# Patient Record
Sex: FEMALE | Race: WHITE | Employment: UNEMPLOYED | URBAN - METROPOLITAN AREA
[De-identification: names, ages, dates, MRNs, and addresses within clinical notes are randomized per-mention and may not be internally consistent; named-entity substitution may affect disease eponyms.]

---

## 2020-06-17 ENCOUNTER — HOSPITAL ENCOUNTER (OUTPATIENT)
Dept: PREADMISSION TESTING | Age: 50
Discharge: HOME | End: 2020-06-17
Payer: MEDICARE

## 2020-06-17 VITALS
SYSTOLIC BLOOD PRESSURE: 113 MMHG | OXYGEN SATURATION: 98 % | HEART RATE: 99 BPM | RESPIRATION RATE: 18 BRPM | HEIGHT: 62 IN | TEMPERATURE: 98.4 F | WEIGHT: 212 LBS | BODY MASS INDEX: 39.01 KG/M2 | DIASTOLIC BLOOD PRESSURE: 73 MMHG

## 2020-06-17 LAB
ABO + RH BLD: NORMAL
ATRIAL RATE: 97 BPM
BASOPHILS # BLD: 0.1 K/UL (ref 0–0.1)
BASOPHILS NFR BLD: 1 % (ref 0–1)
BLOOD GROUP ANTIBODIES SERPL: NORMAL
CALCULATED P AXIS, ECG09: 73 DEGREES
CALCULATED R AXIS, ECG10: 49 DEGREES
CALCULATED T AXIS, ECG11: 43 DEGREES
DIAGNOSIS, 93000: NORMAL
DIFFERENTIAL METHOD BLD: ABNORMAL
EOSINOPHIL # BLD: 0.2 K/UL (ref 0–0.4)
EOSINOPHIL NFR BLD: 1 % (ref 0–7)
ERYTHROCYTE [DISTWIDTH] IN BLOOD BY AUTOMATED COUNT: 13 % (ref 11.5–14.5)
HCT VFR BLD AUTO: 41 % (ref 35–47)
HGB BLD-MCNC: 13.3 G/DL (ref 11.5–16)
IMM GRANULOCYTES # BLD AUTO: 0.1 K/UL (ref 0–0.04)
IMM GRANULOCYTES NFR BLD AUTO: 1 % (ref 0–0.5)
LYMPHOCYTES # BLD: 1.7 K/UL (ref 0.8–3.5)
LYMPHOCYTES NFR BLD: 14 % (ref 12–49)
MCH RBC QN AUTO: 29.8 PG (ref 26–34)
MCHC RBC AUTO-ENTMCNC: 32.4 G/DL (ref 30–36.5)
MCV RBC AUTO: 91.7 FL (ref 80–99)
MONOCYTES # BLD: 0.6 K/UL (ref 0–1)
MONOCYTES NFR BLD: 5 % (ref 5–13)
NEUTS SEG # BLD: 9.2 K/UL (ref 1.8–8)
NEUTS SEG NFR BLD: 78 % (ref 32–75)
NRBC # BLD: 0 K/UL (ref 0–0.01)
NRBC BLD-RTO: 0 PER 100 WBC
P-R INTERVAL, ECG05: 114 MS
PLATELET # BLD AUTO: 305 K/UL (ref 150–400)
PMV BLD AUTO: 10 FL (ref 8.9–12.9)
Q-T INTERVAL, ECG07: 330 MS
QRS DURATION, ECG06: 70 MS
QTC CALCULATION (BEZET), ECG08: 419 MS
RBC # BLD AUTO: 4.47 M/UL (ref 3.8–5.2)
SPECIMEN EXP DATE BLD: NORMAL
VENTRICULAR RATE, ECG03: 97 BPM
WBC # BLD AUTO: 11.7 K/UL (ref 3.6–11)

## 2020-06-17 PROCEDURE — 85025 COMPLETE CBC W/AUTO DIFF WBC: CPT

## 2020-06-17 PROCEDURE — 93005 ELECTROCARDIOGRAM TRACING: CPT

## 2020-06-17 PROCEDURE — 86900 BLOOD TYPING SEROLOGIC ABO: CPT

## 2020-06-17 PROCEDURE — 36415 COLL VENOUS BLD VENIPUNCTURE: CPT

## 2020-06-17 RX ORDER — BUPROPION HYDROCHLORIDE 200 MG/1
200 TABLET, EXTENDED RELEASE ORAL DAILY
COMMUNITY

## 2020-06-17 RX ORDER — FLUTICASONE FUROATE AND VILANTEROL 200; 25 UG/1; UG/1
1 POWDER RESPIRATORY (INHALATION) 2 TIMES DAILY
COMMUNITY

## 2020-06-17 RX ORDER — MINERAL OIL
180 ENEMA (ML) RECTAL DAILY
COMMUNITY

## 2020-06-17 RX ORDER — OXCARBAZEPINE 300 MG/1
300 TABLET, FILM COATED ORAL
COMMUNITY

## 2020-06-17 RX ORDER — NORETHINDRONE 5 MG/1
10 TABLET ORAL DAILY
COMMUNITY
End: 2020-06-17

## 2020-06-17 RX ORDER — DIAZEPAM 2 MG/1
2.5-5 TABLET ORAL EVERY 12 HOURS
COMMUNITY

## 2020-06-17 RX ORDER — MONTELUKAST SODIUM 10 MG/1
10 TABLET ORAL
COMMUNITY

## 2020-06-17 RX ORDER — IBUPROFEN/PSEUDOEPHEDRINE HCL 200MG-30MG
TABLET ORAL
COMMUNITY

## 2020-06-17 RX ORDER — CETIRIZINE HCL 10 MG
10 TABLET ORAL
COMMUNITY

## 2020-06-17 RX ORDER — FENOFIBRATE 145 MG/1
145 TABLET, COATED ORAL
COMMUNITY

## 2020-06-17 RX ORDER — PREDNISONE 20 MG/1
40 TABLET ORAL
COMMUNITY
End: 2020-06-17

## 2020-06-17 RX ORDER — CIPROFLOXACIN 500 MG/1
500 TABLET ORAL 2 TIMES DAILY
COMMUNITY
End: 2020-06-17

## 2020-06-17 RX ORDER — NORGESTIMATE AND ETHINYL ESTRADIOL 0.25-0.035
1 KIT ORAL DAILY
COMMUNITY

## 2020-06-17 RX ORDER — NORGESTIMATE AND ETHINYL ESTRADIOL 0.25-0.035
1 KIT ORAL DAILY
COMMUNITY
End: 2020-06-17

## 2020-06-17 RX ORDER — PANTOPRAZOLE SODIUM 40 MG/1
40 TABLET, DELAYED RELEASE ORAL DAILY
COMMUNITY

## 2020-06-17 RX ORDER — NAPROXEN 500 MG/1
500 TABLET ORAL EVERY 12 HOURS
COMMUNITY

## 2020-06-17 NOTE — PERIOP NOTES
[address] [address] [facility]   MAIN OR                                  [phone]   MAIN PRE OP                          [phone]                                                                                AMBULATORY PRE OP          [phone]  PRE-ADMISSION TESTING    [phone]   Surgery Date:  Wed. June 24, 2020*       Is surgery arrival time given by surgeon? NO  If NO, [facility] staff will call you between 3 and 7pm the day before your surgery with your arrival time. (If your surgery is on a Monday, we will call you the Friday before.)    Call [phone] after 7pm Monday-Friday if you did not receive this call. INSTRUCTIONS BEFORE YOUR SURGERY   When You  Arrive Arrive at the [address] [facility] on the day of your surgery  Have your insurance card, photo ID, and any copayment (if needed)   Food   and   Drink NO food or drink after midnight the night before surgery    This means NO water, gum, mints, coffee, juice, etc.  No alcohol (beer, wine, liquor) 24 hours before and after surgery   Medications to   TAKE   Morning of Surgery MEDICATIONS TO TAKE THE MORNING OF SURGERY WITH A SIP OF WATER:    Bupropian   Pantoprazole   Breo   Allegra   Albuterol if needed   Medications  To  STOP      7 days before surgery  Non-Steroidal anti-inflammatory Drugs (NSAID's): for example, Ibuprofen (Advil, Motrin), Naproxen (Aleve)   Aspirin, if taking for pain    Herbal supplements, vitamins, and fish oil   Probiotics    (Pain medications not listed above, including Tylenol may be taken)   Blood  Thinners  If you take  Aspirin, Plavix, Coumadin, or any blood-thinning or anti-blood clot medicine, talk to the doctor who prescribed the medications for pre-operative instructions.  You may have 1 visitor with you the day of surgery per current hospital policy.    Bathing Clothing  Jewelry  Valuables      If you shower the morning of surgery, please do not apply anything to your skin (lotions, powders, deodorant, or makeup, especially mascara)   Follow Chlorhexidine Care Fusion body wash instructions provided to you during PAT appointment. Begin 3 days prior to surgery.  Do not shave or trim anywhere 24 hours before surgery   Wear your hair loose or down; no pony-tails, buns, or metal hair clips   Wear loose, comfortable, clean clothes   Wear glasses instead of contacts   Leave money, valuables, and jewelry, including body piercings, at home   Going Home - or Spending the Night  SAME-DAY SURGERY: You must have a responsible adult drive you home and stay with you 24 hours after surgery   ADMITS: If your doctor is keeping you in the hospital after surgery, leave personal belongings/luggage in your car until you have a hospital room number. Hospital discharge time is 12 noon  Drivers must be here before 12 noon unless you are told differently   Special Instructions It is now mandated that all surgical patients be tested for COVID-19 prior to surgery. Testing has to be exactly 4 days prior to surgery. Your COVID test date is Saturday, June 20, 2020 between 8:00 am and 11:00 am.       COVID testing will be performed curbside at the [facility]. There will be signs leading you to the testing site. You will need to bring a photo ID with you to be swabbed. Patients are advised to self-quarantine at home after testing and prior to your surgery date. You will be notified if your results are positive.     What to watch for:   Coronavirus (COVID-19) affects different people in different ways   It also appears with a wide range of symptoms from mild to severe   Signs usually appear 2-14 days after exposure     If you develop any of the following, notify your doctor immediately:  o Fever  o Chills, with or without a shiver  o Muscle pain  o Headache  o Sore throat  o Dry cough  o New loss of taste or smell  o Tiredness      If you develop any of the following, call 373:  o Shortness of breath  o Difficulty breathing  o Chest pain  o New confusion  o Blueness of fingers and/or lips    Bring your inhalers with you the day of surgery. Follow all instructions so your surgery wont be cancelled. Please, be on time. If a situation occurs and you are delayed the day of surgery, call [phone]. If your physical condition changes (like a fever, cold, flu, etc.) call your surgeon. Home medication(s) reviewed and verified via  LIST  and VERBAL   during PAT appointment. The patient was contacted by IN-PERSON  The patient verbalizes understanding of all instructions and DOES NOT   need reinforcement.

## 2020-06-20 ENCOUNTER — HOSPITAL ENCOUNTER (OUTPATIENT)
Dept: PREADMISSION TESTING | Age: 50
Discharge: HOME | End: 2020-06-20
Payer: MEDICARE

## 2020-06-20 PROCEDURE — 87635 SARS-COV-2 COVID-19 AMP PRB: CPT

## 2020-06-21 LAB — SARS-COV-2, COV2NT: NOT DETECTED

## 2020-06-23 ENCOUNTER — ANESTHESIA EVENT (OUTPATIENT)
Dept: SURGERY | Age: 50
End: 2020-06-23
Payer: MEDICARE

## 2020-06-24 ENCOUNTER — ANESTHESIA (OUTPATIENT)
Dept: SURGERY | Age: 50
End: 2020-06-24
Payer: MEDICARE

## 2020-06-24 ENCOUNTER — HOSPITAL ENCOUNTER (OUTPATIENT)
Age: 50
Setting detail: OBSERVATION
Discharge: HOME | End: 2020-06-25
Attending: OBSTETRICS & GYNECOLOGY | Admitting: OBSTETRICS & GYNECOLOGY
Payer: MEDICARE

## 2020-06-24 DIAGNOSIS — Z90.710 S/P LAPAROSCOPIC HYSTERECTOMY: Primary | ICD-10-CM

## 2020-06-24 LAB — HCG UR QL: NEGATIVE

## 2020-06-24 PROCEDURE — 77030018836 HC SOL IRR NACL ICUM -A: Performed by: OBSTETRICS & GYNECOLOGY

## 2020-06-24 PROCEDURE — 77030008756 HC TU IRR SUC STRY -B: Performed by: OBSTETRICS & GYNECOLOGY

## 2020-06-24 PROCEDURE — 74011250636 HC RX REV CODE- 250/636: Performed by: ANESTHESIOLOGY

## 2020-06-24 PROCEDURE — 77030018778 HC MANIP UTER VCAR CNMD -B: Performed by: OBSTETRICS & GYNECOLOGY

## 2020-06-24 PROCEDURE — 81025 URINE PREGNANCY TEST: CPT

## 2020-06-24 PROCEDURE — 77030010507 HC ADH SKN DERMBND J&J -B: Performed by: OBSTETRICS & GYNECOLOGY

## 2020-06-24 PROCEDURE — 77030012770 HC TRCR OPT FX AMR -B: Performed by: OBSTETRICS & GYNECOLOGY

## 2020-06-24 PROCEDURE — 77030020053 HC ELECTRD LAPSCP COVD -B: Performed by: OBSTETRICS & GYNECOLOGY

## 2020-06-24 PROCEDURE — 77030037032 HC INSRT SCIS CLICKLLINE DISP STOR -B: Performed by: OBSTETRICS & GYNECOLOGY

## 2020-06-24 PROCEDURE — 77030013079 HC BLNKT BAIR HGGR 3M -A: Performed by: ANESTHESIOLOGY

## 2020-06-24 PROCEDURE — 77030027138 HC INCENT SPIROMETER -A

## 2020-06-24 PROCEDURE — 76210000035 HC AMBSU PH I REC 1 TO 1.5 HR: Performed by: OBSTETRICS & GYNECOLOGY

## 2020-06-24 PROCEDURE — 88307 TISSUE EXAM BY PATHOLOGIST: CPT

## 2020-06-24 PROCEDURE — 74011000250 HC RX REV CODE- 250: Performed by: NURSE ANESTHETIST, CERTIFIED REGISTERED

## 2020-06-24 PROCEDURE — 77030020704 HC DISECT ENDOSC BLNT J&J -B: Performed by: OBSTETRICS & GYNECOLOGY

## 2020-06-24 PROCEDURE — 77030020829: Performed by: OBSTETRICS & GYNECOLOGY

## 2020-06-24 PROCEDURE — G0378 HOSPITAL OBSERVATION PER HR: HCPCS

## 2020-06-24 PROCEDURE — 77030031139 HC SUT VCRL2 J&J -A: Performed by: OBSTETRICS & GYNECOLOGY

## 2020-06-24 PROCEDURE — 65270000029 HC RM PRIVATE

## 2020-06-24 PROCEDURE — 77030027743 HC APPL F/HEMSTAT BARD -B: Performed by: OBSTETRICS & GYNECOLOGY

## 2020-06-24 PROCEDURE — 99218 HC RM OBSERVATION: CPT

## 2020-06-24 PROCEDURE — 76060000065 HC AMB SURG ANES 2.5 TO 3 HR: Performed by: OBSTETRICS & GYNECOLOGY

## 2020-06-24 PROCEDURE — 74011000250 HC RX REV CODE- 250: Performed by: OBSTETRICS & GYNECOLOGY

## 2020-06-24 PROCEDURE — 77030022703 HC LIGASURE  BLNT LAPSCP SEAL COVD -E: Performed by: OBSTETRICS & GYNECOLOGY

## 2020-06-24 PROCEDURE — 77030005513 HC CATH URETH FOL11 MDII -B: Performed by: OBSTETRICS & GYNECOLOGY

## 2020-06-24 PROCEDURE — 77030040922 HC BLNKT HYPOTHRM STRY -A

## 2020-06-24 PROCEDURE — 74011250636 HC RX REV CODE- 250/636: Performed by: OBSTETRICS & GYNECOLOGY

## 2020-06-24 PROCEDURE — 76030000005 HC AMB SURG OR TIME 2.5 TO 3: Performed by: OBSTETRICS & GYNECOLOGY

## 2020-06-24 PROCEDURE — 77030026438 HC STYL ET INTUB CARD -A: Performed by: ANESTHESIOLOGY

## 2020-06-24 PROCEDURE — 74011250637 HC RX REV CODE- 250/637: Performed by: OBSTETRICS & GYNECOLOGY

## 2020-06-24 PROCEDURE — 77030003029 HC SUT VCRL J&J -B: Performed by: OBSTETRICS & GYNECOLOGY

## 2020-06-24 PROCEDURE — 77030002933 HC SUT MCRYL J&J -A: Performed by: OBSTETRICS & GYNECOLOGY

## 2020-06-24 PROCEDURE — 77030032060 HC PWDR HEMSTAT ARISTA ASRB 3GM BARD -C: Performed by: OBSTETRICS & GYNECOLOGY

## 2020-06-24 PROCEDURE — 77030008684 HC TU ET CUF COVD -B: Performed by: ANESTHESIOLOGY

## 2020-06-24 PROCEDURE — 77030040361 HC SLV COMPR DVT MDII -B

## 2020-06-24 PROCEDURE — 74011250636 HC RX REV CODE- 250/636: Performed by: NURSE ANESTHETIST, CERTIFIED REGISTERED

## 2020-06-24 RX ORDER — SODIUM CHLORIDE 0.9 % (FLUSH) 0.9 %
5-40 SYRINGE (ML) INJECTION AS NEEDED
Status: DISCONTINUED | OUTPATIENT
Start: 2020-06-24 | End: 2020-06-24 | Stop reason: HOSPADM

## 2020-06-24 RX ORDER — SODIUM CHLORIDE, SODIUM LACTATE, POTASSIUM CHLORIDE, CALCIUM CHLORIDE 600; 310; 30; 20 MG/100ML; MG/100ML; MG/100ML; MG/100ML
125 INJECTION, SOLUTION INTRAVENOUS CONTINUOUS
Status: DISCONTINUED | OUTPATIENT
Start: 2020-06-24 | End: 2020-06-24 | Stop reason: HOSPADM

## 2020-06-24 RX ORDER — BUPROPION HYDROCHLORIDE 100 MG/1
200 TABLET, EXTENDED RELEASE ORAL DAILY
Status: DISCONTINUED | OUTPATIENT
Start: 2020-06-25 | End: 2020-06-25 | Stop reason: HOSPADM

## 2020-06-24 RX ORDER — DOCUSATE SODIUM 100 MG/1
100 CAPSULE, LIQUID FILLED ORAL 2 TIMES DAILY
Status: DISCONTINUED | OUTPATIENT
Start: 2020-06-24 | End: 2020-06-25 | Stop reason: HOSPADM

## 2020-06-24 RX ORDER — LIDOCAINE HYDROCHLORIDE 10 MG/ML
0.1 INJECTION, SOLUTION EPIDURAL; INFILTRATION; INTRACAUDAL; PERINEURAL AS NEEDED
Status: DISCONTINUED | OUTPATIENT
Start: 2020-06-24 | End: 2020-06-24 | Stop reason: HOSPADM

## 2020-06-24 RX ORDER — MIDAZOLAM HYDROCHLORIDE 1 MG/ML
INJECTION, SOLUTION INTRAMUSCULAR; INTRAVENOUS AS NEEDED
Status: DISCONTINUED | OUTPATIENT
Start: 2020-06-24 | End: 2020-06-24 | Stop reason: HOSPADM

## 2020-06-24 RX ORDER — ONDANSETRON 2 MG/ML
4 INJECTION INTRAMUSCULAR; INTRAVENOUS
Status: DISCONTINUED | OUTPATIENT
Start: 2020-06-24 | End: 2020-06-25 | Stop reason: HOSPADM

## 2020-06-24 RX ORDER — DIPHENHYDRAMINE HYDROCHLORIDE 50 MG/ML
12.5 INJECTION, SOLUTION INTRAMUSCULAR; INTRAVENOUS AS NEEDED
Status: DISCONTINUED | OUTPATIENT
Start: 2020-06-24 | End: 2020-06-24 | Stop reason: HOSPADM

## 2020-06-24 RX ORDER — ROCURONIUM BROMIDE 10 MG/ML
INJECTION, SOLUTION INTRAVENOUS AS NEEDED
Status: DISCONTINUED | OUTPATIENT
Start: 2020-06-24 | End: 2020-06-24 | Stop reason: HOSPADM

## 2020-06-24 RX ORDER — ONDANSETRON 2 MG/ML
INJECTION INTRAMUSCULAR; INTRAVENOUS AS NEEDED
Status: DISCONTINUED | OUTPATIENT
Start: 2020-06-24 | End: 2020-06-24 | Stop reason: HOSPADM

## 2020-06-24 RX ORDER — HYDROMORPHONE HYDROCHLORIDE 2 MG/ML
INJECTION, SOLUTION INTRAMUSCULAR; INTRAVENOUS; SUBCUTANEOUS AS NEEDED
Status: DISCONTINUED | OUTPATIENT
Start: 2020-06-24 | End: 2020-06-24 | Stop reason: HOSPADM

## 2020-06-24 RX ORDER — ACETAMINOPHEN 325 MG/1
650 TABLET ORAL
Status: DISCONTINUED | OUTPATIENT
Start: 2020-06-24 | End: 2020-06-25 | Stop reason: HOSPADM

## 2020-06-24 RX ORDER — SODIUM CHLORIDE, SODIUM LACTATE, POTASSIUM CHLORIDE, CALCIUM CHLORIDE 600; 310; 30; 20 MG/100ML; MG/100ML; MG/100ML; MG/100ML
100 INJECTION, SOLUTION INTRAVENOUS CONTINUOUS
Status: DISCONTINUED | OUTPATIENT
Start: 2020-06-24 | End: 2020-06-24 | Stop reason: HOSPADM

## 2020-06-24 RX ORDER — ALBUTEROL SULFATE 1.25 MG/3ML
1.25 SOLUTION RESPIRATORY (INHALATION)
Status: DISCONTINUED | OUTPATIENT
Start: 2020-06-24 | End: 2020-06-25 | Stop reason: HOSPADM

## 2020-06-24 RX ORDER — PROPOFOL 10 MG/ML
INJECTION, EMULSION INTRAVENOUS AS NEEDED
Status: DISCONTINUED | OUTPATIENT
Start: 2020-06-24 | End: 2020-06-24 | Stop reason: HOSPADM

## 2020-06-24 RX ORDER — KETOROLAC TROMETHAMINE 30 MG/ML
INJECTION, SOLUTION INTRAMUSCULAR; INTRAVENOUS AS NEEDED
Status: DISCONTINUED | OUTPATIENT
Start: 2020-06-24 | End: 2020-06-24 | Stop reason: HOSPADM

## 2020-06-24 RX ORDER — SODIUM CHLORIDE 0.9 % (FLUSH) 0.9 %
5-40 SYRINGE (ML) INJECTION AS NEEDED
Status: DISCONTINUED | OUTPATIENT
Start: 2020-06-24 | End: 2020-06-25 | Stop reason: HOSPADM

## 2020-06-24 RX ORDER — OXCARBAZEPINE 300 MG/1
300 TABLET, FILM COATED ORAL
Status: DISCONTINUED | OUTPATIENT
Start: 2020-06-24 | End: 2020-06-25 | Stop reason: HOSPADM

## 2020-06-24 RX ORDER — HYDROMORPHONE HYDROCHLORIDE 2 MG/1
1 TABLET ORAL
Status: DISCONTINUED | OUTPATIENT
Start: 2020-06-24 | End: 2020-06-25 | Stop reason: HOSPADM

## 2020-06-24 RX ORDER — HYDROMORPHONE HYDROCHLORIDE 1 MG/ML
1 INJECTION, SOLUTION INTRAMUSCULAR; INTRAVENOUS; SUBCUTANEOUS
Status: DISCONTINUED | OUTPATIENT
Start: 2020-06-24 | End: 2020-06-25

## 2020-06-24 RX ORDER — GLYCOPYRROLATE 0.2 MG/ML
INJECTION INTRAMUSCULAR; INTRAVENOUS AS NEEDED
Status: DISCONTINUED | OUTPATIENT
Start: 2020-06-24 | End: 2020-06-24 | Stop reason: HOSPADM

## 2020-06-24 RX ORDER — FENTANYL CITRATE 50 UG/ML
INJECTION, SOLUTION INTRAMUSCULAR; INTRAVENOUS AS NEEDED
Status: DISCONTINUED | OUTPATIENT
Start: 2020-06-24 | End: 2020-06-24 | Stop reason: HOSPADM

## 2020-06-24 RX ORDER — HYDROMORPHONE HYDROCHLORIDE 1 MG/ML
.25-1 INJECTION, SOLUTION INTRAMUSCULAR; INTRAVENOUS; SUBCUTANEOUS
Status: DISCONTINUED | OUTPATIENT
Start: 2020-06-24 | End: 2020-06-24 | Stop reason: HOSPADM

## 2020-06-24 RX ORDER — DIPHENHYDRAMINE HCL 25 MG
25 CAPSULE ORAL
Status: DISCONTINUED | OUTPATIENT
Start: 2020-06-24 | End: 2020-06-25 | Stop reason: HOSPADM

## 2020-06-24 RX ORDER — SODIUM CHLORIDE 9 MG/ML
125 INJECTION, SOLUTION INTRAVENOUS CONTINUOUS
Status: DISCONTINUED | OUTPATIENT
Start: 2020-06-24 | End: 2020-06-25

## 2020-06-24 RX ORDER — NALOXONE HYDROCHLORIDE 0.4 MG/ML
0.4 INJECTION, SOLUTION INTRAMUSCULAR; INTRAVENOUS; SUBCUTANEOUS AS NEEDED
Status: DISCONTINUED | OUTPATIENT
Start: 2020-06-24 | End: 2020-06-25 | Stop reason: HOSPADM

## 2020-06-24 RX ORDER — SUCCINYLCHOLINE CHLORIDE 20 MG/ML
INJECTION INTRAMUSCULAR; INTRAVENOUS AS NEEDED
Status: DISCONTINUED | OUTPATIENT
Start: 2020-06-24 | End: 2020-06-24 | Stop reason: HOSPADM

## 2020-06-24 RX ORDER — METRONIDAZOLE 500 MG/100ML
500 INJECTION, SOLUTION INTRAVENOUS
Status: COMPLETED | OUTPATIENT
Start: 2020-06-24 | End: 2020-06-24

## 2020-06-24 RX ORDER — SODIUM CHLORIDE, SODIUM LACTATE, POTASSIUM CHLORIDE, CALCIUM CHLORIDE 600; 310; 30; 20 MG/100ML; MG/100ML; MG/100ML; MG/100ML
75 INJECTION, SOLUTION INTRAVENOUS CONTINUOUS
Status: DISCONTINUED | OUTPATIENT
Start: 2020-06-24 | End: 2020-06-24 | Stop reason: HOSPADM

## 2020-06-24 RX ORDER — BUPIVACAINE HYDROCHLORIDE AND EPINEPHRINE 2.5; 5 MG/ML; UG/ML
INJECTION, SOLUTION EPIDURAL; INFILTRATION; INTRACAUDAL; PERINEURAL AS NEEDED
Status: DISCONTINUED | OUTPATIENT
Start: 2020-06-24 | End: 2020-06-24 | Stop reason: HOSPADM

## 2020-06-24 RX ORDER — LIDOCAINE HYDROCHLORIDE 20 MG/ML
INJECTION, SOLUTION EPIDURAL; INFILTRATION; INTRACAUDAL; PERINEURAL AS NEEDED
Status: DISCONTINUED | OUTPATIENT
Start: 2020-06-24 | End: 2020-06-24 | Stop reason: HOSPADM

## 2020-06-24 RX ORDER — DIAZEPAM 5 MG/1
2.5 TABLET ORAL EVERY 12 HOURS
Status: DISCONTINUED | OUTPATIENT
Start: 2020-06-24 | End: 2020-06-25 | Stop reason: HOSPADM

## 2020-06-24 RX ORDER — NEOSTIGMINE METHYLSULFATE 1 MG/ML
INJECTION, SOLUTION INTRAVENOUS AS NEEDED
Status: DISCONTINUED | OUTPATIENT
Start: 2020-06-24 | End: 2020-06-24 | Stop reason: HOSPADM

## 2020-06-24 RX ORDER — DEXAMETHASONE SODIUM PHOSPHATE 4 MG/ML
INJECTION, SOLUTION INTRA-ARTICULAR; INTRALESIONAL; INTRAMUSCULAR; INTRAVENOUS; SOFT TISSUE AS NEEDED
Status: DISCONTINUED | OUTPATIENT
Start: 2020-06-24 | End: 2020-06-24 | Stop reason: HOSPADM

## 2020-06-24 RX ORDER — ONDANSETRON 2 MG/ML
4 INJECTION INTRAMUSCULAR; INTRAVENOUS AS NEEDED
Status: DISCONTINUED | OUTPATIENT
Start: 2020-06-24 | End: 2020-06-24 | Stop reason: HOSPADM

## 2020-06-24 RX ORDER — ZOLPIDEM TARTRATE 5 MG/1
5 TABLET ORAL
Status: DISCONTINUED | OUTPATIENT
Start: 2020-06-24 | End: 2020-06-25 | Stop reason: HOSPADM

## 2020-06-24 RX ORDER — KETOROLAC TROMETHAMINE 30 MG/ML
30 INJECTION, SOLUTION INTRAMUSCULAR; INTRAVENOUS EVERY 6 HOURS
Status: DISCONTINUED | OUTPATIENT
Start: 2020-06-24 | End: 2020-06-25 | Stop reason: HOSPADM

## 2020-06-24 RX ORDER — SODIUM CHLORIDE 0.9 % (FLUSH) 0.9 %
5-40 SYRINGE (ML) INJECTION EVERY 8 HOURS
Status: DISCONTINUED | OUTPATIENT
Start: 2020-06-24 | End: 2020-06-25 | Stop reason: HOSPADM

## 2020-06-24 RX ORDER — SODIUM CHLORIDE 0.9 % (FLUSH) 0.9 %
5-40 SYRINGE (ML) INJECTION EVERY 8 HOURS
Status: DISCONTINUED | OUTPATIENT
Start: 2020-06-24 | End: 2020-06-24 | Stop reason: HOSPADM

## 2020-06-24 RX ADMIN — METRONIDAZOLE 500 MG: 500 SOLUTION INTRAVENOUS at 11:26

## 2020-06-24 RX ADMIN — CEFAZOLIN SODIUM 2 G: 1 POWDER, FOR SOLUTION INTRAMUSCULAR; INTRAVENOUS at 09:34

## 2020-06-24 RX ADMIN — Medication 10 ML: at 21:12

## 2020-06-24 RX ADMIN — FENTANYL CITRATE 50 MCG: 50 INJECTION, SOLUTION INTRAMUSCULAR; INTRAVENOUS at 11:00

## 2020-06-24 RX ADMIN — SODIUM CHLORIDE 125 ML/HR: 900 INJECTION, SOLUTION INTRAVENOUS at 13:43

## 2020-06-24 RX ADMIN — DEXAMETHASONE SODIUM PHOSPHATE 4 MG: 4 INJECTION, SOLUTION INTRAMUSCULAR; INTRAVENOUS at 09:31

## 2020-06-24 RX ADMIN — SUCCINYLCHOLINE CHLORIDE 100 MG: 20 INJECTION, SOLUTION INTRAMUSCULAR; INTRAVENOUS; PARENTERAL at 09:22

## 2020-06-24 RX ADMIN — DOCUSATE SODIUM 100 MG: 100 CAPSULE, LIQUID FILLED ORAL at 18:11

## 2020-06-24 RX ADMIN — FENTANYL CITRATE 50 MCG: 50 INJECTION, SOLUTION INTRAMUSCULAR; INTRAVENOUS at 11:59

## 2020-06-24 RX ADMIN — ROCURONIUM BROMIDE 10 MG: 10 INJECTION, SOLUTION INTRAVENOUS at 11:00

## 2020-06-24 RX ADMIN — DOCUSATE SODIUM 100 MG: 100 CAPSULE, LIQUID FILLED ORAL at 15:20

## 2020-06-24 RX ADMIN — GLYCOPYRROLATE 0.4 MG: 0.2 INJECTION, SOLUTION INTRAMUSCULAR; INTRAVENOUS at 11:57

## 2020-06-24 RX ADMIN — HYDROMORPHONE HYDROCHLORIDE 1 MG: 2 TABLET ORAL at 19:43

## 2020-06-24 RX ADMIN — ROCURONIUM BROMIDE 5 MG: 10 INJECTION, SOLUTION INTRAVENOUS at 09:21

## 2020-06-24 RX ADMIN — SODIUM CHLORIDE, POTASSIUM CHLORIDE, SODIUM LACTATE AND CALCIUM CHLORIDE: 600; 310; 30; 20 INJECTION, SOLUTION INTRAVENOUS at 10:22

## 2020-06-24 RX ADMIN — ROCURONIUM BROMIDE 15 MG: 10 INJECTION, SOLUTION INTRAVENOUS at 10:37

## 2020-06-24 RX ADMIN — SODIUM CHLORIDE, POTASSIUM CHLORIDE, SODIUM LACTATE AND CALCIUM CHLORIDE: 600; 310; 30; 20 INJECTION, SOLUTION INTRAVENOUS at 08:30

## 2020-06-24 RX ADMIN — LIDOCAINE HYDROCHLORIDE 50 MG: 20 INJECTION, SOLUTION INTRAVENOUS at 09:21

## 2020-06-24 RX ADMIN — DIAZEPAM 2.5 MG: 5 TABLET ORAL at 21:10

## 2020-06-24 RX ADMIN — HYDROMORPHONE HYDROCHLORIDE 0.5 MG: 1 INJECTION, SOLUTION INTRAMUSCULAR; INTRAVENOUS; SUBCUTANEOUS at 13:15

## 2020-06-24 RX ADMIN — ONDANSETRON HYDROCHLORIDE 4 MG: 2 SOLUTION INTRAMUSCULAR; INTRAVENOUS at 09:30

## 2020-06-24 RX ADMIN — PROPOFOL 150 MG: 10 INJECTION, EMULSION INTRAVENOUS at 09:21

## 2020-06-24 RX ADMIN — Medication 3 MG: at 11:57

## 2020-06-24 RX ADMIN — SODIUM CHLORIDE 125 ML/HR: 900 INJECTION, SOLUTION INTRAVENOUS at 21:10

## 2020-06-24 RX ADMIN — Medication 10 ML: at 15:21

## 2020-06-24 RX ADMIN — MIDAZOLAM HYDROCHLORIDE 2 MG: 2 INJECTION, SOLUTION INTRAMUSCULAR; INTRAVENOUS at 09:15

## 2020-06-24 RX ADMIN — OXCARBAZEPINE 300 MG: 300 TABLET, FILM COATED ORAL at 21:10

## 2020-06-24 RX ADMIN — KETOROLAC TROMETHAMINE 30 MG: 30 INJECTION, SOLUTION INTRAMUSCULAR at 18:11

## 2020-06-24 RX ADMIN — SODIUM CHLORIDE, POTASSIUM CHLORIDE, SODIUM LACTATE AND CALCIUM CHLORIDE: 600; 310; 30; 20 INJECTION, SOLUTION INTRAVENOUS at 09:34

## 2020-06-24 RX ADMIN — KETOROLAC TROMETHAMINE 30 MG: 30 INJECTION INTRAMUSCULAR; INTRAVENOUS at 12:09

## 2020-06-24 RX ADMIN — FENTANYL CITRATE 50 MCG: 50 INJECTION, SOLUTION INTRAMUSCULAR; INTRAVENOUS at 10:22

## 2020-06-24 RX ADMIN — FENTANYL CITRATE 100 MCG: 50 INJECTION, SOLUTION INTRAMUSCULAR; INTRAVENOUS at 09:21

## 2020-06-24 RX ADMIN — Medication 10 ML: at 13:43

## 2020-06-24 NOTE — ANESTHESIA PREPROCEDURE EVALUATION
Relevant Problems   No relevant active problems       Anesthetic History   No history of anesthetic complications            Review of Systems / Medical History  Patient summary reviewed, nursing notes reviewed and pertinent labs reviewed    Pulmonary            Asthma : well controlled       Neuro/Psych         Psychiatric history    Comments: Anxiety/depression Cardiovascular                  Exercise tolerance: >4 METS  Comments: \"leaky valve\" = tricuspid regurg   GI/Hepatic/Renal     GERD: well controlled          Comments: Urinary incontinence Endo/Other        Obesity     Other Findings              Physical Exam    Airway  Mallampati: II    Neck ROM: normal range of motion   Mouth opening: Normal     Cardiovascular    Rhythm: regular  Rate: normal         Dental  No notable dental hx       Pulmonary  Breath sounds clear to auscultation               Abdominal         Other Findings            Anesthetic Plan    ASA: 2  Anesthesia type: general          Induction: Intravenous  Anesthetic plan and risks discussed with: Patient      Informed consent obtained.

## 2020-06-24 NOTE — DISCHARGE INSTRUCTIONS
Laparoscopic Hysterectomy: What to Expect at [address] can expect to feel better and stronger each day, although you may need pain medicine for a week or two. You may get tired easily or have less energy than usual. This may last for several weeks after surgery. You will probably notice that your belly is swollen and puffy. This is common. The swelling will take several weeks to go down. You may take 4 to 6 weeks to fully recover. It is important to avoid lifting while you are recovering so that you can heal.    Follow-up care is a key part of your treatment and safety. Be sure to make and go to all appointments, and call my office if you are having problems. How can you care for yourself at home? Activity  Rest when you feel tired. Getting enough sleep will help you recover. Try to walk each day. Start by walking a little more than you did the day before. Bit by bit, increase the amount you walk. Walking boosts blood flow and helps prevent pneumonia and constipation. Avoid lifting anything that would make you strain. This may include grocery bags and milk containers, a heavy briefcase, dog food bags, a child, or a vacuum . Avoid strenuous activities, such as housework, aerobic exercise, or  weight lifting, until your doctor says it is okay. You may shower. If you have incisions in your belly, pat them dry when you are done. Do not take a bath for the first 2 weeks or until your doctor tells you it is okay. You may drive when you are no longer taking prescription pain medicine and can quickly move your foot from the gas pedal to the brake. You must also be able to sit comfortably for a long period of time, even if you do not plan on going far. You might get caught in traffic. You will probably need to take 2 to 4 weeks off from work. It depends on the type of work you do and how you feel. Your doctor will tell you when you can have sex again.       Diet  You can eat your normal diet. If your stomach is upset, try bland, low-fat foods like plain rice, broiled chicken, toast, and yogurt. Drink plenty of fluids (unless your doctor tells you not to). You may notice that your bowel movements are not regular right after your surgery. This is common. Try to avoid constipation and straining with bowel movements. You may want to take a fiber supplement or stool softener every day. If you have not had a bowel movement after a couple of days, ask your doctor about taking a mild laxative. Medicines  If the doctor gave you a prescription medicine for pain, take it as prescribed. If you are not taking a prescription pain medicine, take an over-the-counter medicine such as acetaminophen (Tylenol), ibuprofen (Advil, Motrin), or naproxen (Aleve). Read and follow all instructions on the label. Do not take two or more pain medicines at the same time unless the doctor told you to. Many pain medicines contain acetaminophen, which is Tylenol. Too much Tylenol can be harmful. If your doctor prescribed antibiotics, take them as directed. Do not stop taking them just because you feel better. You need to take the full course of antibiotics. If you think your pain medicine is making you sick to your stomach: Take your medicine after meals (unless your doctor tells you not to). Ask your doctor for a different pain medicine. Incision care  If you had surgery with a laparoscope, you may have skin glue or strips of tape over the cuts (incisions) the doctor made in your belly. Gently wash the area daily with warm, soapy water and pat it dry. Do not use other cleaning products, such as hydrogen peroxide which can make the wound heal more slowly. Do NOT bandage the incisions, but you may cover the areas with a gauze bandage if it weeps or rubs against clothing. Change the bandage every day. Keep the area clean and dry. Other instructions  You may have some light vaginal bleeding.  Wear sanitary pads if needed. Do not douche or use tampons. When should you call for help? Call 911 anytime you think you may need emergency care. For example, call if:  You pass out (lose consciousness). You have sudden chest pain and shortness of breath, or you cough up blood. You have severe pain in your belly. Call your doctor now or seek immediate medical care if:  You have bright red vaginal bleeding that soaks one or more pads in an hour, or you have large clots. Your have foul-smelling discharge from your vagina. You are sick to your stomach or cannot keep fluids down. You have pain that does not get better after you take pain medicine. You have loose stitches, or your incision comes open. You have signs of infection, such as: Increased pain, swelling, warmth, or redness. Red streaks leading from your incision. Pus draining from your incision. Swollen lymph nodes in your neck, armpits, or groin. A fever. You have signs of a blood clot, such as:  Pain in your calf, back of knee, thigh, or groin. Redness and swelling in your leg or groin. You have trouble passing urine or stool, especially if you have pain or swelling in your lower belly. You have hot flashes, sweating, flushing, or a fast or pounding heartbeat. Watch closely for changes in your health, and be sure to contact your doctor if:  You do not have a bowel movement after taking a laxative.

## 2020-06-24 NOTE — PROGRESS NOTES
6/24/2020  4:01 PM  Reason for Admission: Elective - Abnormal uterine bleeding and pelvic pain requiring hysterectomy. Assessment:   []In person with pt   []Via p/c with pt   []With family member in person. Who/Relation:     []With family member via p/c. Who/Relation:   [x]Chart Review    RUR: 4%  Risk Level: [x]Low []Moderate []High  Value-based purchasing: [] Yes [x] No  Bundle patient: [] Yes [x] No   Specify:     Advance Directive: Full Code. No AD on file. Assessment:    Age: 54    Sex: [] Male [x]Female     Residency: [x]Private residence []Apartment []Assisted Living []LTC []Other:   Exterior Steps: 0  Interior Steps: 0    Lives With: []With spouse [x]Other family members []Underage children []Alone []Care provider []Other:    Prior functioning:  [x]Independent []Dependent []Partial dependence   Pt requires assistance with: []Bathing []Dressing []Toileting []Ambulation     Prior DME required:  [x]None []RW []Cane []Crutches []Bedside commode []CPAP []Home O2 (Liter/Provider: ) []Nebulizer   []Shower Chair []Wheelchair []Hospital Bed []Kiko []Stair lift []Rollator []Other:    DME available: [x]None []RW []Cane []Crutches []Bedside commode []CPAP []Home O2 (Liter/Provider: ) []Nebulizer   []Shower Chair []Wheelchair []Hospital Bed []Kiko []Stair lift []Rollator []Other:    Rehab history: [x]None []Outpatient PT []Home Health (Provider/Date: ) []SNF (Provider/Date: ) []IPR (Provider/Date: ) []LTC (Provider/Date: )    Discharge Concerns: []Yes [x]No []Unknown   Describe: Insurer:  [employer] / [facility] Medicaid    PCP: Lin Ayers MD   Name of Practice: [facility]   Current patient: [x]Yes []No   Approximate date of last visit: Unknown   Access to virtual PCP visits: []Yes []No    Pharmacy: [address] [place] [address] Transport: Family / Medicaid        Transition of care plan:    []Unable to determine at this time. Awaiting clinical progress, and disposition recommendations.     [x] Home with outpatient follow-up    [] Home with Outpatient PT and outpatient follow-up   Pt aware of OP appt? []Yes, Provider:   []Not scheduled   Transport provider:     [] Home with family assistance as needed and outpatient follow-up   Family able to assist:    Schedule:  Transport provider:      [] Home with Home Health   - Provider:     []SNF/IPR   -[]Preferences given:   []Listing provided and preferences requested   -Status: []Pending []Accepted:    -Auth required: []Yes []No    -Auth initiated date:   -3 midnight stay required: []Yes []No  Date satisfied:     [] Other:     Rodger Bryan MA    Care Management Interventions  PCP Verified by CM: Yes(Clary)  Mode of Transport at Discharge:  Other (see comment)(Family)  MyChart Signup: No  Discharge Durable Medical Equipment: No  Physical Therapy Consult: No  Occupational Therapy Consult: No  Speech Therapy Consult: No  Current Support Network: Relative's Home  Confirm Follow Up Transport: Family  Discharge Location  Discharge Placement: Home with family assistance

## 2020-06-24 NOTE — DISCHARGE SUMMARY
Gynecology Discharge Summary     Patient ID:  Marietta Proctor  [id number]  10 y.o.  1970    Admit date: 6/24/2020    Discharge date and time: 6/25/20    Admission Diagnoses:    Patient Active Problem List   Diagnosis Code    S/P laparoscopic hysterectomy Z90.710       Discharge Diagnoses: No discharge information exists for this patient. Patient Active Problem List   Diagnosis Code    S/P laparoscopic hysterectomy Z90.710       Procedures for this admission: Procedure(s):  Total laparoscopic hysterectomy AND BILATERAL SALPINGECTOMY    Hospital Course: Diet was advanced, she tolerated medications and was able to ambulate and void. Disposition: home    Discharged Condition : stable    Instructions: Follow-up in office  in 2 weeks.               Signed:  Francois Mike MD  6/24/2020  1:57 PM

## 2020-06-24 NOTE — H&P
There has been no interval change from H&P. Patient is ready for surgery today. The patient was counseled at length about the risks of ahmet Covid-19 during their perioperative period and any recovery window from their procedure. The patient was made aware that ahmet Covid-19  may worsen their prognosis for recovering from their procedure and lend to a higher morbidity and/or mortality risk. All material risks, benefits, and reasonable alternatives including postponing the procedure were discussed. The patient does  wish to proceed with the procedure at this time.       Zakiya Maynard MD  [facility]

## 2020-06-24 NOTE — ANESTHESIA POSTPROCEDURE EVALUATION
Procedure(s):  LAPAROSCOPIC ASSISTED VAGINAL HYSTERECTOMY  (TVH) AND BILATERAL SALPINGECTOMY. general    Anesthesia Post Evaluation      Multimodal analgesia: multimodal analgesia not used between 6 hours prior to anesthesia start to PACU discharge  Patient location during evaluation: PACU  Patient participation: complete - patient participated  Level of consciousness: sleepy but conscious and responsive to verbal stimuli  Pain score: 0  Pain management: adequate  Airway patency: patent  Anesthetic complications: no  Cardiovascular status: hemodynamically stable and acceptable  Respiratory status: acceptable  Hydration status: acceptable  Comments: Patient seen and evaluated; no concerns. Post anesthesia nausea and vomiting:  none      INITIAL Post-op Vital signs:   Vitals Value Taken Time   /76 6/24/2020  1:00 PM   Temp 36.5 °C (97.7 °F) 6/24/2020 12:22 PM   Pulse 57 6/24/2020  1:05 PM   Resp 16 6/24/2020  1:05 PM   SpO2 96 % 6/24/2020  1:05 PM   Vitals shown include unvalidated device data.

## 2020-06-24 NOTE — PROGRESS NOTES
4th floor Charge RN ( (un-named)  RN)  notified of admission and awaiting receiving ( tba  RN) call-back for room 416.

## 2020-06-24 NOTE — BRIEF OP NOTE
Brief Postoperative Note    Patient: Deven Villeda  YOB: 1970  MRN: [id number]    Date of Procedure: 6/24/2020     Pre-Op Diagnosis: ABNORMAL UTERINE BLEEDING  PELVIC PAIN    Post-Op Diagnosis: Same      Procedure(s):  Total laparoscopic hysterectomy, bilateral salpingectomy    Surgeon(s):  MD Dilcia Landry DO    Surgical Assistant: None    Anesthesia: General     Estimated Blood Loss (mL): less than 221     Complications: None    Specimens:   ID Type Source Tests Collected by Time Destination   1 : Uterus, Cervix, Bilateral Fallopian Tubes Preservative Uterus  Sierra Donaldson MD 6/24/2020 1114 Pathology        Implants:   Implant Name Type Inv. Item Serial No.  Lot No. LRB No. Used Action   MARIBETH ABSORBABLE HEMOSTATIC PARTICLES   NA BARD [id number] N/A 1 Implanted       Drains: * No LDAs found *    Findings: Normal appearing uterus and bilateral fallopian tubes and ovaries. Small amount of adhesions of bowel to L fallopian tube. Filshie clip on L fallopian tube. Adhesions of bowel to R sidewall.      Electronically Signed by Harrison Terrell MD on 6/24/2020 at 12:06 PM

## 2020-06-24 NOTE — PROGRESS NOTES
Bedside shift change report given to Cookie Almonte RN (oncoming nurse) by Demetris Travis RN (offgoing nurse). Report included the following information SBAR, Kardex and MAR.

## 2020-06-24 NOTE — ROUTINE PROCESS
TRANSFER - OUT REPORT: 
 
Verbal report given to Slava Spaulding RN (name) on Farida Ko  being transferred to [facility] (unit) for routine post - op Report consisted of patients Situation, Background, Assessment and  
Recommendations(SBAR). Information from the following report(s) SBAR, OR Summary, Intake/Output, MAR and Cardiac Rhythm SB-SR was reviewed with the receiving nurse. Lines:  
Peripheral IV 06/24/20 Left Hand (Active) Site Assessment Clean, dry, & intact 6/24/2020 12:15 PM  
Phlebitis Assessment 0 6/24/2020 12:15 PM  
Infiltration Assessment 0 6/24/2020 12:15 PM  
Dressing Status Clean, dry, & intact 6/24/2020 12:15 PM  
Dressing Type Transparent 6/24/2020 12:15 PM  
Hub Color/Line Status Infusing 6/24/2020 12:15 PM  
Alcohol Cap Used Yes 6/24/2020  7:33 AM  
   
Peripheral IV 06/24/20 Right Hand (Active) Site Assessment Clean, dry, & intact 6/24/2020 12:15 PM  
Phlebitis Assessment 0 6/24/2020 12:15 PM  
Infiltration Assessment 0 6/24/2020 12:15 PM  
Dressing Status Clean, dry, & intact 6/24/2020 12:15 PM  
Dressing Type Transparent 6/24/2020 12:15 PM  
Hub Color/Line Status Capped 6/24/2020 12:15 PM  
  
 
Opportunity for questions and clarification was provided. Patient transported with: 
 O2 @ 3 liters Registered Nurse

## 2020-06-24 NOTE — PROGRESS NOTES
Patient seen post op. Doing well, pain controlled. Tolerating crackers and jello. Asking for dinner. Wants to get up out of bed. Hill in place, 100cc in catheter. Will see in Am with expectations for discharge home.     Viviana Camacho MD  [facility]

## 2020-06-25 VITALS
OXYGEN SATURATION: 94 % | DIASTOLIC BLOOD PRESSURE: 92 MMHG | TEMPERATURE: 98.2 F | HEART RATE: 101 BPM | SYSTOLIC BLOOD PRESSURE: 147 MMHG | WEIGHT: 209.88 LBS | HEIGHT: 62 IN | RESPIRATION RATE: 16 BRPM | BODY MASS INDEX: 38.62 KG/M2

## 2020-06-25 LAB
ERYTHROCYTE [DISTWIDTH] IN BLOOD BY AUTOMATED COUNT: 13 % (ref 11.5–14.5)
HCT VFR BLD AUTO: 35.4 % (ref 35–47)
HGB BLD-MCNC: 11.5 G/DL (ref 11.5–16)
MCH RBC QN AUTO: 29.8 PG (ref 26–34)
MCHC RBC AUTO-ENTMCNC: 32.5 G/DL (ref 30–36.5)
MCV RBC AUTO: 91.7 FL (ref 80–99)
NRBC # BLD: 0 K/UL (ref 0–0.01)
NRBC BLD-RTO: 0 PER 100 WBC
PLATELET # BLD AUTO: 298 K/UL (ref 150–400)
PMV BLD AUTO: 10.6 FL (ref 8.9–12.9)
RBC # BLD AUTO: 3.86 M/UL (ref 3.8–5.2)
WBC # BLD AUTO: 12.5 K/UL (ref 3.6–11)

## 2020-06-25 PROCEDURE — 85027 COMPLETE CBC AUTOMATED: CPT

## 2020-06-25 PROCEDURE — 74011250636 HC RX REV CODE- 250/636: Performed by: OBSTETRICS & GYNECOLOGY

## 2020-06-25 PROCEDURE — 99218 HC RM OBSERVATION: CPT

## 2020-06-25 PROCEDURE — G0378 HOSPITAL OBSERVATION PER HR: HCPCS

## 2020-06-25 PROCEDURE — 36415 COLL VENOUS BLD VENIPUNCTURE: CPT

## 2020-06-25 PROCEDURE — 74011250637 HC RX REV CODE- 250/637: Performed by: OBSTETRICS & GYNECOLOGY

## 2020-06-25 RX ORDER — HYDROMORPHONE HYDROCHLORIDE 2 MG/1
1 TABLET ORAL
Qty: 15 TAB | Refills: 0 | Status: SHIPPED | OUTPATIENT
Start: 2020-06-25 | End: 2020-06-30

## 2020-06-25 RX ADMIN — KETOROLAC TROMETHAMINE 30 MG: 30 INJECTION, SOLUTION INTRAMUSCULAR at 00:09

## 2020-06-25 RX ADMIN — BUPROPION HYDROCHLORIDE 200 MG: 100 TABLET, FILM COATED, EXTENDED RELEASE ORAL at 09:48

## 2020-06-25 RX ADMIN — HYDROMORPHONE HYDROCHLORIDE 1 MG: 2 TABLET ORAL at 04:24

## 2020-06-25 RX ADMIN — KETOROLAC TROMETHAMINE 30 MG: 30 INJECTION, SOLUTION INTRAMUSCULAR at 05:35

## 2020-06-25 RX ADMIN — HYDROMORPHONE HYDROCHLORIDE 1 MG: 2 TABLET ORAL at 12:12

## 2020-06-25 RX ADMIN — KETOROLAC TROMETHAMINE 30 MG: 30 INJECTION, SOLUTION INTRAMUSCULAR at 12:12

## 2020-06-25 RX ADMIN — Medication 10 ML: at 05:35

## 2020-06-25 RX ADMIN — DOCUSATE SODIUM 100 MG: 100 CAPSULE, LIQUID FILLED ORAL at 09:41

## 2020-06-25 RX ADMIN — DIAZEPAM 2.5 MG: 5 TABLET ORAL at 09:41

## 2020-06-25 NOTE — PROGRESS NOTES
6/25/2020  9:34 AM  RUR:  4%  Risk Level: [x]Low []Moderate []High  Value-based purchasing: [] Yes [x] No  Bundle patient: [] Yes [x] No   Specify:     Transition of care plan:  1. DC order submitted. 2. Home with family assistance as needed. 3. Outpatient follow-up. 4. Pt's family to transport. Care Management Interventions  PCP Verified by CM: Yes(Clary)  Mode of Transport at Discharge:  Other (see comment)(Family)  MyChart Signup: No  Discharge Durable Medical Equipment: No  Physical Therapy Consult: No  Occupational Therapy Consult: No  Speech Therapy Consult: No  Current Support Network: Relative's Home  Confirm Follow Up Transport: Family  Discharge Location  Discharge Placement: Home with family assistance

## 2020-06-25 NOTE — PROGRESS NOTES
CMS Note  6/25/2020    Patient received the Observation letter, patient was not able to sign it for CMS. Patient was given a copy for their record.   Colton Mcneil CMS

## 2020-06-25 NOTE — PROGRESS NOTES
Problem: Falls - Risk of  Goal: *Absence of Falls  Description: Document Zandra Rubalcava Fall Risk and appropriate interventions in the flowsheet.   6/25/2020 1107 by [email or web address]  Outcome: Resolved/Met  Note: Fall Risk Interventions:            Medication Interventions: Teach patient to arise slowly, Patient to call before getting OOB    Elimination Interventions: Call light in reach, Patient to call for help with toileting needs    History of Falls Interventions: Bed/chair exit alarm, Utilize gait belt for transfer/ambulation      6/25/2020 0853 by [email or web address]  Outcome: Progressing Towards Goal  Note: Fall Risk Interventions:            Medication Interventions: Teach patient to arise slowly, Patient to call before getting OOB    Elimination Interventions: Call light in reach, Patient to call for help with toileting needs    History of Falls Interventions: Bed/chair exit alarm, Utilize gait belt for transfer/ambulation         Problem: Patient Education: Go to Patient Education Activity  Goal: Patient/Family Education  6/25/2020 1107 by Pikum  Outcome: Resolved/Met  6/25/2020 0853 by [email or web address]  Outcome: Progressing Towards Goal     Problem: Patient Education: Go to Patient Education Activity  Goal: Patient/Family Education  6/25/2020 1107 by Pikum  Outcome: Resolved/Met  6/25/2020 0853 by Pikum  Outcome: Progressing Towards Goal     Problem: Abdominal Hysterectomy: Post-Op Day 1  Goal: *Active bowel sounds  6/25/2020 1107 by Pikum  Outcome: Resolved/Met  6/25/2020 0854 by Pikum  Outcome: Progressing Towards Goal  6/25/2020 0853 by Pikum  Outcome: Progressing Towards Goal  Goal: *Passing flatus  6/25/2020 1107 by Pikum  Outcome: Resolved/Met  6/25/2020 0854 by Pikum  Outcome: Progressing Towards Goal  Goal: *Voiding  6/25/2020 1107 by Pikum  Outcome: Resolved/Met  6/25/2020 0854 by Pikum  Outcome: Progressing Towards Goal

## 2020-06-25 NOTE — PROGRESS NOTES
CMS Note  6/25/2020    Due to patient not receiving their 1st IMM letter, patient received and signed their 2nd IMM letter. Patient was given a copy for their record.   Joo Yang CMS
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
## 2020-06-25 NOTE — PROGRESS NOTES
PostOp Note    Hemant Stokes  [id number]  1970  54 y.o.    S:  Ms. Hemant Stokes is a 54 y.o. POD#1 s/p TLH, bilateral salpingectomy for AUB, pelvic pain. She is doing well. Her pain is well controlled with IV meds. She has no nausea and has tolerated a biscuit and eggs this AM.  UOP has been adequate. Has not voided since catheter removal since 6a  She denies ha/cp/n/v/sob. She has minimal vaginal bleeding. Has not yet been out of bed but would like to get up. O:    Visit Vitals  /69 (BP 1 Location: Left arm, BP Patient Position: At rest)   Pulse 93   Temp 98.2 °F (36.8 °C)   Resp 16   Ht 5' 2\" (1.575 m)   Wt 95.2 kg (209 lb 14.1 oz)   SpO2 95%   BMI 38.39 kg/m²         Gen - No acute distress  CV - Regular rate & rhythm  Pulm - Clear to auscultation Bilaterally  Abd - +bowel sounds, soft, appropriately tender. No rebound/guarding   - No blood on perineum  Extr - SCDs in place, warm and well perfused    A/P:  POD#1 s/p TLH, bilateral salpingectomy. Doing well   1. Routine PO care  2. IVF - stop  3. Pain control - dc iv meds- convert to po dilaudid  4. Labs - hgb stable  5. Hill removed this am - UOP adequate; dc home pending void  6. SCDs until ambulatory  7. Continue to encourage IS. O2 Saturation stable. 8.  Anticipate D/C POD# 1.         Thuy Van MD  [facility]

## 2020-06-25 NOTE — PROGRESS NOTES
Patient received a script for Dilaudid and a copy of instructions. Medication and instructions were read and explained to her.  Verbalized understanding

## 2020-06-25 NOTE — OP NOTES
[facility]  OPERATIVE REPORT    Name:  Kierra Mazariegos  MR#:  [id number]  :  1970  ACCOUNT #:  [de-identified]  DATE OF SERVICE:  2020    PREOPERATIVE DIAGNOSES:  Abnormal uterine bleeding and pelvic pain. POSTOPERATIVE DIAGNOSES:  Abnormal uterine bleeding and pelvic pain. PROCEDURES PERFORMED:  Total laparoscopic hysterectomy and bilateral salpingectomy. SURGEON:  Satish Lama MD    ASSISTANT:  Neal Solis DO    SURGICAL ASSISTANT:  None. ANESTHESIA:  GETA. COMPLICATIONS:  None. SPECIMENS REMOVED:  Uterus, cervix, and bilateral fallopian tubes. IMPLANTS:  None. ESTIMATED BLOOD LOSS:  Less than 100 mL. INDICATIONS:  A 54year-old with persistent abnormal uterine bleeding, not improved with her medications, as well as pelvic pain. FINDINGS:  Normal-appearing uterus and bilateral fallopian tubes and ovaries. Small amount of adhesions of the bowel to the left fallopian tube and sidewall. Filshie clip on the left fallopian tube and there were also additional adhesions of the bowel to the right abdominal sidewall. PROCEDURE:  The patient was taken to the OR and placed on the OR table. She was intubated with GETA. She then had her legs placed in [address] [place] [address]. Her arms were tucked and she was prepped and draped in a sterile fashion. Prior to the start, a timeout was performed. A Hill catheter was placed in the bladder and a sterile speculum was inserted in the uterus and a tenaculum was placed on the anterior lip of the cervix. A VCare manipulator was placed in standard fashion and the balloon was inflated. Attention was then turned to the abdominal portion of the procedure. A 5-mm incision was made at the umbilicus and a Veress needle was unable to be placed successfully and therefore, the abdomen was entered under direct optical.  The abdomen was then insufflated to 15 mmHg.   A 5-mm right lower quadrant port was then placed under direct visualization followed by a left lower quadrant port in the same fashion. The above findings were then noted on inspection of the pelvis. The right fallopian tube was grasped and dissected along mesosalpinx using the LigaSure. The utero-ovarian artery was then desiccated and ligated with the LigaSure. This was then followed down to the round ligament which was desiccated and ligated and the anterior broad ligament was then able to be entered and a bladder flap was initially created. Attention was turned to the left side and the procedure was repeated on the left. The bladder flap was further developed with blunt and sharp dissection. Once the bladder was well away, the uterine arteries were doubly cauterized and then ligated. This was serially done until the [facility] manipulator was palpated. The bipolar hook on cut was used to amputate the specimen using the VCare device as a landmark. Attention was then turned to the vagina where the specimen was removed without difficulty. A short weighted tenaculum was placed in the vagina. The vaginal cuff was closed in running lock fashion with two #0 Vicryl sutures in a running locked fashion. Hemostasis was noted to be good. Attention was then turned back to the abdominal portion of the procedure where the abdomen was inspected for hemostasis. It was irrigated and suctioned with minimal oozing noted along the vaginal cuff. Wagner was then placed at this time. All instruments were then removed under direction visualization. The port sites were then closed with a 4-0 Monocryl and Dermabond. The patient's legs were taken out of the stirrups. All counts were correct. She tolerated the procedure well, was extubated, and taken to the recovery room.       MD CHRIS Mitchell/[email or web address]  D:  06/24/2020 14:10  T:  06/25/2020 1:20  JOB #:  [id number]

## 2021-09-16 PROBLEM — G89.4 CHRONIC PAIN SYNDROME: Status: ACTIVE | Noted: 2021-09-16

## 2021-09-16 PROBLEM — E78.00 HYPERCHOLESTEROLEMIA: Status: ACTIVE | Noted: 2021-09-16

## 2022-03-18 PROBLEM — Z90.710 S/P LAPAROSCOPIC HYSTERECTOMY: Status: ACTIVE | Noted: 2020-06-24

## 2022-03-18 PROBLEM — E78.00 HYPERCHOLESTEROLEMIA: Status: ACTIVE | Noted: 2021-09-16

## 2022-03-19 PROBLEM — G89.4 CHRONIC PAIN SYNDROME: Status: ACTIVE | Noted: 2021-09-16

## 2023-04-04 ENCOUNTER — OFFICE VISIT (OUTPATIENT)
Dept: ENT CLINIC | Age: 53
End: 2023-04-04
Payer: MEDICAID

## 2023-04-04 PROCEDURE — 99203 OFFICE O/P NEW LOW 30 MIN: CPT | Performed by: OTOLARYNGOLOGY

## 2023-04-04 NOTE — PROGRESS NOTES
Otolaryngology-Head and Neck Surgery  New Patient Visit     Patient: Jeannine Yun  YOB: 1970  MRN: [id number]  Date of Service: 4/4/2023    Chief Complaint:   Chief Complaint   Patient presents with    New Patient    Cold Symptoms     Runny nose       History of Present Illness: Jeannine Yun is a 50y.o. year old female who presents today for discussion of allergies     She's here with her son today    Hx of allergies and asthma for years    [de-identified] with Dr Adin Whitney but would like to transition care    Has been on various antihistamines, nasal spray, singulair  Uses different inhalers, unsure which    Has been on multiple courses of steroids and antibiotics back to back    Was on immunotherapy, sounds like for many years, did find to be helpful  Last allergy shots 6 months ago       Past Medical History:  Past Medical History:   Diagnosis Date    Asthma     GERD (gastroesophageal reflux disease)     IBS (irritable bowel syndrome)     Leaky heart valve 2019    patient states \"leaky valve\"    Psychiatric disorder     anxiety    Stress incontinence in female        Past Surgical History:   Past Surgical History:   Procedure Laterality Date    HX ORTHOPAEDIC Right 2008    ORIF    HX UROLOGICAL  1973    reconstruction of urethra    [place] UNLISTED PROCEDURE ABDOMEN PERITONEUM & OMENTUM  2008    lap nghia       Medications:   Current Outpatient Medications   Medication Instructions    Advair -21 mcg/actuation inhaler No dose, route, or frequency recorded. ALBUTEROL SULFATE IN Inhalation    buPROPion SR (WELLBUTRIN, ZYBAN) 200 mg, Oral, DAILY    cetirizine (ZYRTEC) 10 mg, Oral, EVERY BEDTIME    diazePAM (VALIUM) 2.5-5 mg, Oral, EVERY 12 HOURS    diclofenac (VOLTAREN) 1 % gel Apply two grams by topical route four times daily to the affected area (s).     fenofibrate nanocrystallized (TRICOR) 145 mg, Oral, EVERY BEDTIME    fexofenadine (ALLEGRA) 180 mg, Oral, DAILY    FLUoxetine (PROzac) 20 mg capsule TAKE ONE CAPSULE BY MOUTH TWICE DAILY (IN THE MORNING AND AT NOON)    fluticasone furoate-vilanteroL (BREO ELLIPTA) 200-25 mcg/dose inhaler 1 Puff, Inhalation, 2 TIMES DAILY    fluticasone propionate (FLONASE) 50 mcg/actuation nasal spray No dose, route, or frequency recorded. hydrOXYzine HCL (ATARAX) 25 mg tablet No dose, route, or frequency recorded. Lactobac 40-Bifido 3-S.thermop (Probiotic) 100 billion cell cap Oral    mometasone (NASONEX) 50 mcg/actuation nasal spray No dose, route, or frequency recorded. montelukast (SINGULAIR) 10 mg, Oral, EVERY BEDTIME    naproxen (NAPROSYN) 500 mg, Oral, EVERY 12 HOURS    norgestimate-ethinyl estradioL (Sprintec, 28,) 0.25-35 mg-mcg tab 1 Tablet, Oral, DAILY    OXcarbazepine (TRILEPTAL) 300 mg, Oral, EVERY BEDTIME    pantoprazole (PROTONIX) 40 mg, Oral, DAILY    predniSONE (DELTASONE) 20 mg tablet No dose, route, or frequency recorded. Spiriva Respimat 1.25 mcg/actuation inhaler No dose, route, or frequency recorded. tolterodine tartrate (TOLTERODINE PO) 4 mg, Oral, DAILY    Trelegy Ellipta 200-62.5-25 mcg dsdv No dose, route, or frequency recorded. Allergies: Allergies   Allergen Reactions    Lortab [Hydrocodone-Acetaminophen] Hives     Could have been morphine    Morphine Hives     Could have been hydrocodone       Social History:   Social History     Tobacco Use    Smoking status: Former     Packs/day: 0.25     Years: 10.00     Pack years: 2.50     Types: Cigarettes     Quit date: 1996     Years since quittin.8    Smokeless tobacco: Never   Substance Use Topics    Alcohol use: Not Currently    Drug use: Not Currently        Family History:  No family history on file. Review of Systems:    Consitutional: denies fever, excessive weight gain or loss. Eyes: denies diplopia, eye pain. Integumentary: denies new concerning skin lesions. Ears, Nose, Mouth, Throat: denies except as per HPI.   Endocrine: denies hot or cold intolerance, increased thirst.  Respiratory: denies cough, hemoptysis, wheezing  Gastrointestinal: denies trouble swallowing, nausea, emesis, regurgitation  Musculoskeletal: denies muscle weakness or wasting  Cardiovascular: denies chest pain, shortness of breath  Neurologic: denies seizures, numbness or tingling, syncope  Hematologic: denies easy bleeding or bruising    Physical Examination:   Vitals:    04/04/23 1323   BP: 122/80   BP 1 Location: Left upper arm   BP Patient Position: Sitting   BP Cuff Size: Adult   Pulse: 96   Resp: 19   Height: 5' 3\" (1.6 m)   Weight: 242 lb (109.8 kg)   SpO2: 92%        General: Comfortable, pleasant, appears stated age  Voice: Strong, speaking in full sentences, no stridor    Face: No masses or lesions, facial strength symmetric   Ears: External ears unremarkable. Bilateral ear canal clear. Tympanic membrane clear and intact, with visible landmarks. Clear middle ear space  Nose: External nose unremarkable. Dorsum midline. Anterior rhinoscopy demonstrates no lesions. Septum midline. Turbinates without hypertrophy. Oral Cavity / Oropharynx: No trismus. Mucosa pink and moist. No lesions. Tongue is midline and mobile. Palate elevates symmetrically. Uvula midline. Tonsils unremarkable. Base of tongue soft. Floor of mouth soft. Neck: Supple. No adenopathy. Thyroid unremarkable. Palpable laryngeal landmarks. Full neck range of motion   Neurologic: CN II - XI intact. Normal gait      Assessment and Plan:   Allergic rhinitis  Asthma   - Will refill singulair, zyrtec and flonase  - Add astelin nasal spray  - Unclear which inhalers she is using vs old Rx  - Will arrange pulmonary referral  - Request records from Dr Michelle Fleischer office  - She is interested in IT. Has done for many years already. Likely will retest           The patient was instructed to return to clinic if no improvement or progression of symptoms. Signs to watch out for reviewed.       Tushar Mccarty MD   [facility] [[email or web address]] [address]  [address] [facility]  Office Phone: [phone]

## 2023-04-04 NOTE — PROGRESS NOTES
Chief Complaint   Patient presents with    New Patient    Cold Symptoms     Runny nose       Visit Vitals  /80 (BP 1 Location: Left upper arm, BP Patient Position: Sitting, BP Cuff Size: Adult)   Pulse 96   Resp 19   Ht 5' 3\" (1.6 m)   Wt 242 lb (109.8 kg)   SpO2 92%   BMI 42.87 kg/m²

## 2023-04-04 NOTE — Clinical Note
Can we arrange referral to pulmonary She was previously seen Dr Cruz Roth, would like to see someone else She would like to try and see same pulmonology as her son, though not sure if possible (not sure if any pulm who see kids also)

## 2023-04-05 RX ORDER — AZELASTINE 1 MG/ML
1 SPRAY, METERED NASAL 2 TIMES DAILY
Qty: 1 EACH | Refills: 1 | Status: SHIPPED
Start: 2023-04-05

## 2023-04-05 RX ORDER — MONTELUKAST SODIUM 10 MG/1
10 TABLET ORAL DAILY
Qty: 90 TABLET | Refills: 1 | Status: SHIPPED
Start: 2023-04-05

## 2023-04-05 RX ORDER — FLUTICASONE PROPIONATE 50 MCG
2 SPRAY, SUSPENSION (ML) NASAL DAILY
Qty: 1 EACH | Refills: 1 | Status: SHIPPED
Start: 2023-04-05

## 2023-04-05 RX ORDER — CETIRIZINE HYDROCHLORIDE 10 MG/1
10 TABLET ORAL
Qty: 90 TABLET | Refills: 1 | Status: SHIPPED
Start: 2023-04-05

## 2023-04-17 ENCOUNTER — TELEPHONE (OUTPATIENT)
Dept: ENT CLINIC | Age: 53
End: 2023-04-17

## 2023-04-17 NOTE — TELEPHONE ENCOUNTER
Pt called stating that she would like to cancel her allergy test tomorrow because her and her son (Will Quick) were supposed to be testing and starting immunotherapy at the same time and she did not want to move forward with testing unless her and Will could be tested together on the same day. I looked at [facility] last visit with Dr. Anupama Michaels and saw that a referral to [facility] pulmonary was put in and to my understanding, it looks like he needed to see them first before having another follow up visit with Dr. Anupama Michaels. The pt stated she was not aware of any referrals and requested that Dr. Anupama Michaels or her nurse call her back to explain what needs to be done in regards to her and her son's care.

## 2023-05-09 ENCOUNTER — NURSE ONLY (OUTPATIENT)
Age: 53
End: 2023-05-09

## 2023-05-09 VITALS — HEART RATE: 77 BPM | DIASTOLIC BLOOD PRESSURE: 82 MMHG | OXYGEN SATURATION: 97 % | SYSTOLIC BLOOD PRESSURE: 120 MMHG

## 2023-05-09 DIAGNOSIS — J30.89 ALLERGIC RHINITIS DUE TO OTHER ALLERGIC TRIGGER, UNSPECIFIED SEASONALITY: Primary | ICD-10-CM

## 2023-05-09 NOTE — ADDENDUM NOTE
Addended by: Jennifer Stevenson on: 5/9/2023 11:42 AM     Modules accepted: Orders, Level of Service

## 2023-05-12 ENCOUNTER — TELEPHONE (OUTPATIENT)
Age: 53
End: 2023-05-12

## 2023-05-12 NOTE — TELEPHONE ENCOUNTER
Called pt to review allergy test result for both her and her son    No answer so left VM for call back

## 2023-05-22 ENCOUNTER — OFFICE VISIT (OUTPATIENT)
Age: 53
End: 2023-05-22
Payer: MEDICARE

## 2023-05-22 VITALS
SYSTOLIC BLOOD PRESSURE: 135 MMHG | OXYGEN SATURATION: 94 % | DIASTOLIC BLOOD PRESSURE: 80 MMHG | HEIGHT: 63 IN | HEART RATE: 96 BPM | WEIGHT: 237 LBS | RESPIRATION RATE: 16 BRPM | BODY MASS INDEX: 41.99 KG/M2

## 2023-05-22 DIAGNOSIS — J45.20 MILD INTERMITTENT ASTHMA WITHOUT COMPLICATION: ICD-10-CM

## 2023-05-22 DIAGNOSIS — Z91.09 ENVIRONMENTAL ALLERGIES: Primary | ICD-10-CM

## 2023-05-22 PROCEDURE — 99213 OFFICE O/P EST LOW 20 MIN: CPT | Performed by: OTOLARYNGOLOGY

## 2023-05-22 PROCEDURE — G8427 DOCREV CUR MEDS BY ELIG CLIN: HCPCS | Performed by: OTOLARYNGOLOGY

## 2023-05-22 PROCEDURE — 3017F COLORECTAL CA SCREEN DOC REV: CPT | Performed by: OTOLARYNGOLOGY

## 2023-05-22 PROCEDURE — 1036F TOBACCO NON-USER: CPT | Performed by: OTOLARYNGOLOGY

## 2023-05-22 PROCEDURE — G8417 CALC BMI ABV UP PARAM F/U: HCPCS | Performed by: OTOLARYNGOLOGY

## 2023-05-22 RX ORDER — EPINEPHRINE 0.3 MG/.3ML
INJECTION SUBCUTANEOUS
Qty: 2 EACH | Refills: 0 | Status: SHIPPED | OUTPATIENT
Start: 2023-05-22

## 2023-05-22 RX ORDER — LEVOCETIRIZINE DIHYDROCHLORIDE 5 MG/1
5 TABLET, FILM COATED ORAL NIGHTLY
Qty: 90 TABLET | Refills: 1 | Status: SHIPPED | OUTPATIENT
Start: 2023-05-22

## 2023-05-22 NOTE — PROGRESS NOTES
Otolaryngology-Head and Neck Surgery  Follow Up Patient Visit     Patient: Judy Walter  YOB: 1970  MRN: [id number]  Date of Service: 5/22/2023      Chief Complaint:   Chief Complaint   Patient presents with    New Patient    Cold Symptoms     Runny nose       Interval hx 5/22/2023  Doing better  Had allergy testing    History of Present Illness: Judy Walter is a 50y.o. year old female who presents today for discussion of allergies     She's here with her son today    Hx of allergies and asthma for years    [de-identified] with Dr Troy Varma but would like to transition care    Has been on various antihistamines, nasal spray, singulair  Uses different inhalers, unsure which    Has been on multiple courses of steroids and antibiotics back to back    Was on immunotherapy, sounds like for many years, did find to be helpful  Last allergy shots 6 months ago       Past Medical History:  Past Medical History:   Diagnosis Date    Asthma     GERD (gastroesophageal reflux disease)     IBS (irritable bowel syndrome)     Leaky heart valve 2019    patient states \"leaky valve\"    Psychiatric disorder     anxiety    Stress incontinence in female        Past Surgical History:   Past Surgical History:   Procedure Laterality Date    HX ORTHOPAEDIC Right 2008    ORIF    HX UROLOGICAL  1973    reconstruction of urethra    [place] UNLISTED PROCEDURE ABDOMEN PERITONEUM & OMENTUM  2008    lap tina       Medications:   Current Outpatient Medications   Medication Instructions    Advair -21 mcg/actuation inhaler No dose, route, or frequency recorded. ALBUTEROL SULFATE IN Inhalation    buPROPion SR (WELLBUTRIN, ZYBAN) 200 mg, Oral, DAILY    cetirizine (ZYRTEC) 10 mg, Oral, EVERY BEDTIME    diazePAM (VALIUM) 2.5-5 mg, Oral, EVERY 12 HOURS    diclofenac (VOLTAREN) 1 % gel Apply two grams by topical route four times daily to the affected area (s).     fenofibrate nanocrystallized (TRICOR) 145 mg, Oral, EVERY BEDTIME    fexofenadine

## 2023-05-22 NOTE — PROGRESS NOTES
Chief Complaint   Patient presents with    Follow-up     /80   Pulse 96   Resp 16   Ht 5' 3\" (1.6 m)   Wt 237 lb (107.5 kg)   SpO2 94%   BMI 41.98 kg/m²   1. Have you been to the ER, urgent care clinic since your last visit? Hospitalized since your last visit? No    2. Have you seen or consulted any other health care providers outside of the [address] [place] [address] since your last visit? Include any pap smears or colon screening.  No

## 2023-06-01 ENCOUNTER — TELEPHONE (OUTPATIENT)
Age: 53
End: 2023-06-01

## 2023-06-01 NOTE — TELEPHONE ENCOUNTER
PC to pt reviewed allergy benefits and immunotherapy protocol for her and her son. She does want to proceed. I will call her when her serum is ready.  Yonny

## 2023-06-20 ENCOUNTER — NURSE ONLY (OUTPATIENT)
Age: 53
End: 2023-06-20
Payer: MEDICARE

## 2023-06-20 VITALS — HEART RATE: 89 BPM | DIASTOLIC BLOOD PRESSURE: 74 MMHG | OXYGEN SATURATION: 98 % | SYSTOLIC BLOOD PRESSURE: 122 MMHG

## 2023-06-20 DIAGNOSIS — J30.89 ALLERGIC RHINITIS DUE TO OTHER ALLERGIC TRIGGER, UNSPECIFIED SEASONALITY: Primary | ICD-10-CM

## 2023-06-20 PROCEDURE — 95117 IMMUNOTHERAPY INJECTIONS: CPT | Performed by: NURSE PRACTITIONER

## 2023-06-20 NOTE — PROGRESS NOTES
I have reviewed the documentation [facility], and I agree with her assessment and documented findings. Abner Paredes.  RUSS Quezada, [facility], [address] [facility] Allergy  [phone] unk

## 2023-07-10 ENCOUNTER — NURSE ONLY (OUTPATIENT)
Age: 53
End: 2023-07-10
Payer: MEDICARE

## 2023-07-10 VITALS — OXYGEN SATURATION: 96 % | HEART RATE: 88 BPM | SYSTOLIC BLOOD PRESSURE: 132 MMHG | DIASTOLIC BLOOD PRESSURE: 84 MMHG

## 2023-07-10 DIAGNOSIS — J30.89 ALLERGIC RHINITIS DUE TO OTHER ALLERGIC TRIGGER, UNSPECIFIED SEASONALITY: Primary | ICD-10-CM

## 2023-07-10 PROCEDURE — 95117 IMMUNOTHERAPY INJECTIONS: CPT | Performed by: NURSE PRACTITIONER

## 2023-07-11 NOTE — PROGRESS NOTES
I have reviewed the documentation by Erin Cordova RN, and I agree with her assessment and documented findings. Sosa Dockery.  Marvel Patel, APRN, [facility], [address] [place] [address] ENT and Allergy  [phone]

## 2023-07-12 ENCOUNTER — TELEPHONE (OUTPATIENT)
Age: 53
End: 2023-07-12

## 2023-10-03 ENCOUNTER — TELEPHONE (OUTPATIENT)
Age: 53
End: 2023-10-03

## 2023-10-24 ENCOUNTER — OFFICE VISIT (OUTPATIENT)
Age: 53
End: 2023-10-24
Payer: MEDICARE

## 2023-10-24 VITALS
WEIGHT: 237 LBS | BODY MASS INDEX: 41.99 KG/M2 | HEIGHT: 63 IN | SYSTOLIC BLOOD PRESSURE: 120 MMHG | HEART RATE: 94 BPM | OXYGEN SATURATION: 96 % | RESPIRATION RATE: 16 BRPM | DIASTOLIC BLOOD PRESSURE: 76 MMHG

## 2023-10-24 DIAGNOSIS — Z91.09 ENVIRONMENTAL ALLERGIES: Primary | ICD-10-CM

## 2023-10-24 DIAGNOSIS — J01.91 ACUTE RECURRENT SINUSITIS, UNSPECIFIED LOCATION: ICD-10-CM

## 2023-10-24 PROCEDURE — 99213 OFFICE O/P EST LOW 20 MIN: CPT | Performed by: OTOLARYNGOLOGY

## 2023-10-24 RX ORDER — LEVOCETIRIZINE DIHYDROCHLORIDE 5 MG/1
5 TABLET, FILM COATED ORAL NIGHTLY
Qty: 90 TABLET | Refills: 1 | Status: SHIPPED | OUTPATIENT
Start: 2023-10-24

## 2023-10-24 RX ORDER — MONTELUKAST SODIUM 10 MG/1
10 TABLET ORAL NIGHTLY
Qty: 90 TABLET | Refills: 1 | Status: SHIPPED | OUTPATIENT
Start: 2023-10-24

## 2023-10-24 RX ORDER — AMOXICILLIN AND CLAVULANATE POTASSIUM 875; 125 MG/1; MG/1
1 TABLET, FILM COATED ORAL 2 TIMES DAILY
Qty: 20 TABLET | Refills: 0 | Status: SHIPPED | OUTPATIENT
Start: 2023-10-24 | End: 2023-11-03

## 2023-10-24 RX ORDER — FLUTICASONE PROPIONATE 50 MCG
1 SPRAY, SUSPENSION (ML) NASAL DAILY
Qty: 16 G | Refills: 2 | Status: SHIPPED | OUTPATIENT
Start: 2023-10-24

## 2023-10-31 ENCOUNTER — TELEPHONE (OUTPATIENT)
Age: 53
End: 2023-10-31

## 2023-10-31 NOTE — TELEPHONE ENCOUNTER
PC to pt, reminder that her and her sons allergy serum is ready. She will call back when her son starts feeling better.  Yonny

## 2023-11-24 DIAGNOSIS — J30.89 ALLERGIC RHINITIS DUE TO OTHER ALLERGIC TRIGGER, UNSPECIFIED SEASONALITY: Primary | ICD-10-CM

## 2023-11-24 RX ORDER — LEVOCETIRIZINE DIHYDROCHLORIDE 5 MG/1
5 TABLET, FILM COATED ORAL NIGHTLY
Qty: 90 TABLET | Refills: 1 | Status: SHIPPED | OUTPATIENT
Start: 2023-11-24

## 2023-11-30 ENCOUNTER — TELEPHONE (OUTPATIENT)
Age: 53
End: 2023-11-30

## 2024-01-17 RX ORDER — FLUTICASONE PROPIONATE 50 MCG
1 SPRAY, SUSPENSION (ML) NASAL DAILY
Qty: 16 G | Refills: 2 | Status: SHIPPED | OUTPATIENT
Start: 2024-01-17

## 2024-04-11 DIAGNOSIS — J30.89 ALLERGIC RHINITIS DUE TO OTHER ALLERGIC TRIGGER, UNSPECIFIED SEASONALITY: ICD-10-CM

## 2024-04-11 RX ORDER — MONTELUKAST SODIUM 10 MG/1
10 TABLET ORAL NIGHTLY
Qty: 90 TABLET | Refills: 1 | Status: SHIPPED | OUTPATIENT
Start: 2024-04-11

## 2024-04-11 RX ORDER — FLUTICASONE PROPIONATE 50 MCG
1 SPRAY, SUSPENSION (ML) NASAL DAILY
Qty: 16 G | Refills: 2 | Status: SHIPPED | OUTPATIENT
Start: 2024-04-11

## 2024-04-11 RX ORDER — LEVOCETIRIZINE DIHYDROCHLORIDE 5 MG/1
5 TABLET, FILM COATED ORAL NIGHTLY
Qty: 90 TABLET | Refills: 1 | Status: SHIPPED | OUTPATIENT
Start: 2024-04-11

## 2024-06-24 ENCOUNTER — TELEPHONE (OUTPATIENT)
Age: 54
End: 2024-06-24

## 2024-06-26 ENCOUNTER — TELEPHONE (OUTPATIENT)
Age: 54
End: 2024-06-26

## 2024-06-26 DIAGNOSIS — J30.89 ALLERGIC RHINITIS DUE TO OTHER ALLERGIC TRIGGER, UNSPECIFIED SEASONALITY: ICD-10-CM

## 2024-06-26 RX ORDER — LEVOCETIRIZINE DIHYDROCHLORIDE 5 MG/1
5 TABLET, FILM COATED ORAL NIGHTLY
Qty: 90 TABLET | Refills: 1 | Status: SHIPPED | OUTPATIENT
Start: 2024-06-26

## 2024-06-26 RX ORDER — MONTELUKAST SODIUM 10 MG/1
10 TABLET ORAL NIGHTLY
Qty: 90 TABLET | Refills: 1 | Status: SHIPPED | OUTPATIENT
Start: 2024-06-26

## 2024-06-26 NOTE — TELEPHONE ENCOUNTER
Pt ([phone]) requested a refill on the following medications:    Levocetirizine 5 MG  Montelukast 10 MG    Please send to:   Rx (used by [employer])    P - [phone]  F - [phone]    [facility] - [facility] ([facility])

## 2024-07-11 DIAGNOSIS — J30.89 ALLERGIC RHINITIS DUE TO OTHER ALLERGIC TRIGGER, UNSPECIFIED SEASONALITY: Primary | ICD-10-CM

## 2024-07-11 RX ORDER — FLUTICASONE PROPIONATE 50 MCG
1 SPRAY, SUSPENSION (ML) NASAL DAILY
Qty: 16 G | Refills: 2 | Status: SHIPPED | OUTPATIENT
Start: 2024-07-11

## 2024-07-22 ENCOUNTER — OFFICE VISIT (OUTPATIENT)
Age: 54
End: 2024-07-22
Payer: MEDICARE

## 2024-07-22 VITALS
WEIGHT: 246 LBS | BODY MASS INDEX: 43.59 KG/M2 | HEIGHT: 63 IN | DIASTOLIC BLOOD PRESSURE: 82 MMHG | HEART RATE: 101 BPM | OXYGEN SATURATION: 95 % | SYSTOLIC BLOOD PRESSURE: 140 MMHG | RESPIRATION RATE: 18 BRPM

## 2024-07-22 DIAGNOSIS — Z91.09 ENVIRONMENTAL ALLERGIES: ICD-10-CM

## 2024-07-22 DIAGNOSIS — J30.89 ALLERGIC RHINITIS DUE TO OTHER ALLERGIC TRIGGER, UNSPECIFIED SEASONALITY: Primary | ICD-10-CM

## 2024-07-22 DIAGNOSIS — J45.20 MILD INTERMITTENT ASTHMA WITHOUT COMPLICATION: ICD-10-CM

## 2024-07-22 DIAGNOSIS — J01.91 ACUTE RECURRENT SINUSITIS, UNSPECIFIED LOCATION: ICD-10-CM

## 2024-07-22 DIAGNOSIS — F31.70 BIPOLAR DISORDER IN FULL REMISSION, MOST RECENT EPISODE UNSPECIFIED TYPE (HCC): ICD-10-CM

## 2024-07-22 PROCEDURE — 99214 OFFICE O/P EST MOD 30 MIN: CPT | Performed by: OTOLARYNGOLOGY

## 2024-07-22 NOTE — PROGRESS NOTES
Otolaryngology-Head and Neck Surgery  Follow Up Patient Visit     Patient: Elisabeth Odell  YOB: 1970  MRN: [id number]  Date of Service: 7/22/2024      Chief Complaint:   Chief Complaint   Patient presents with    Follow-up     Environmental allergies         Interval hx 7/22/2024  Doing better overall as far as allergies go  On singulair, xyzal, flonase  Asthma controlled as well  No sinus infection issues    Interval hx 10/2023  Started IT but was not able to continue due to insurance potentially becoming out of network  This has resolved    Recently with allergy exacerbation and sinus infection   Son also sick     Interval hx 5/2023  Doing better  Had allergy testing    History of Present Illness: Elisabeth Odell is a 52 y.o. year old female who presents today for discussion of allergies     She's here with her son today    Hx of allergies and asthma for years    Follows with Dr Valerio but would like to transition care    Has been on various antihistamines, nasal spray, singulair  Uses different inhalers, unsure which    Has been on multiple courses of steroids and antibiotics back to back    Was on immunotherapy, sounds like for many years, did find to be helpful  Last allergy shots 6 months ago       Past Medical History:  Past Medical History:   Diagnosis Date    Asthma     GERD (gastroesophageal reflux disease)     IBS (irritable bowel syndrome)     Leaky heart valve 2019    patient states \"leaky valve\"    Psychiatric disorder     anxiety    Stress incontinence in female        Past Surgical History:   Past Surgical History:   Procedure Laterality Date    HX ORTHOPAEDIC Right 2008    ORIF    HX UROLOGICAL  1973    reconstruction of urethra    [place] UNLISTED PROCEDURE ABDOMEN PERITONEUM & OMENTUM  2008    lap tina       Medications:   Current Outpatient Medications   Medication Sig Dispense Refill    fluticasone (FLONASE) 50 MCG/ACT nasal spray 1 spray by Nasal route daily ceived the following from [facility]

## 2024-09-12 DIAGNOSIS — J30.89 ALLERGIC RHINITIS DUE TO OTHER ALLERGIC TRIGGER, UNSPECIFIED SEASONALITY: ICD-10-CM

## 2024-09-12 RX ORDER — FLUTICASONE PROPIONATE 50 MCG
1 SPRAY, SUSPENSION (ML) NASAL DAILY
Qty: 16 G | Refills: 2 | Status: SHIPPED | OUTPATIENT
Start: 2024-09-12

## 2024-11-11 ENCOUNTER — TELEPHONE (OUTPATIENT)
Age: 54
End: 2024-11-11

## 2024-11-12 DIAGNOSIS — J30.89 ALLERGIC RHINITIS DUE TO OTHER ALLERGIC TRIGGER, UNSPECIFIED SEASONALITY: ICD-10-CM

## 2024-11-12 RX ORDER — FLUTICASONE PROPIONATE 50 MCG
1 SPRAY, SUSPENSION (ML) NASAL DAILY
Qty: 16 G | Refills: 2 | OUTPATIENT
Start: 2024-11-12

## 2024-11-12 RX ORDER — MONTELUKAST SODIUM 10 MG/1
10 TABLET ORAL NIGHTLY
Qty: 90 TABLET | Refills: 1 | OUTPATIENT
Start: 2024-11-12

## 2024-11-12 RX ORDER — LEVOCETIRIZINE DIHYDROCHLORIDE 5 MG/1
5 TABLET, FILM COATED ORAL NIGHTLY
Qty: 90 TABLET | Refills: 1 | OUTPATIENT
Start: 2024-11-12

## 2024-12-17 ENCOUNTER — TELEPHONE (OUTPATIENT)
Age: 54
End: 2024-12-17

## 2025-01-20 ENCOUNTER — OFFICE VISIT (OUTPATIENT)
Age: 55
End: 2025-01-20
Payer: MEDICARE

## 2025-01-20 VITALS
OXYGEN SATURATION: 99 % | HEART RATE: 93 BPM | RESPIRATION RATE: 16 BRPM | DIASTOLIC BLOOD PRESSURE: 86 MMHG | HEIGHT: 63 IN | WEIGHT: 256 LBS | BODY MASS INDEX: 45.36 KG/M2 | SYSTOLIC BLOOD PRESSURE: 118 MMHG

## 2025-01-20 DIAGNOSIS — J45.20 MILD INTERMITTENT ASTHMA WITHOUT COMPLICATION: Primary | ICD-10-CM

## 2025-01-20 DIAGNOSIS — J01.91 ACUTE RECURRENT SINUSITIS, UNSPECIFIED LOCATION: ICD-10-CM

## 2025-01-20 DIAGNOSIS — J30.89 ALLERGIC RHINITIS DUE TO OTHER ALLERGIC TRIGGER, UNSPECIFIED SEASONALITY: ICD-10-CM

## 2025-01-20 PROCEDURE — 99213 OFFICE O/P EST LOW 20 MIN: CPT | Performed by: OTOLARYNGOLOGY

## 2025-01-20 RX ORDER — FLUTICASONE PROPIONATE 50 MCG
1 SPRAY, SUSPENSION (ML) NASAL DAILY
Qty: 16 G | Refills: 3 | Status: SHIPPED | OUTPATIENT
Start: 2025-01-20

## 2025-01-20 RX ORDER — TOLTERODINE 4 MG/1
CAPSULE, EXTENDED RELEASE ORAL
COMMUNITY

## 2025-01-20 RX ORDER — BLOOD-GLUCOSE METER
EACH MISCELLANEOUS
COMMUNITY
Start: 2025-01-08

## 2025-01-20 RX ORDER — MONTELUKAST SODIUM 10 MG/1
10 TABLET ORAL NIGHTLY
Qty: 90 TABLET | Refills: 2 | Status: SHIPPED | OUTPATIENT
Start: 2025-01-20

## 2025-01-20 RX ORDER — LANCETS 33 GAUGE
EACH MISCELLANEOUS
COMMUNITY
Start: 2025-01-09

## 2025-01-20 RX ORDER — SEMAGLUTIDE 0.68 MG/ML
INJECTION, SOLUTION SUBCUTANEOUS
COMMUNITY
Start: 2025-01-03

## 2025-01-20 RX ORDER — LEVOCETIRIZINE DIHYDROCHLORIDE 5 MG/1
5 TABLET, FILM COATED ORAL NIGHTLY
Qty: 90 TABLET | Refills: 2 | Status: SHIPPED | OUTPATIENT
Start: 2025-01-20

## 2025-01-20 NOTE — PROGRESS NOTES
Otolaryngology-Head and Neck Surgery  Follow Up Patient Visit     Patient: Elisabeth Odell  YOB: 1970  MRN: [id number]  Date of Service: 1/20/2025      Chief Complaint:   Chief Complaint   Patient presents with    Follow-up     Allergic rhinitis due to other allergic trigger, unspecified seasonality         Interval hx 1/20/2025  Doing well until last month or 2  Ran out of allergy Rx, unclear what happened as far as refill request - looks like we submitted in November   Since then has had worsening congestion, post nasal drip and sinus pressure  Feels has turned into an infection     Interval hx 7/2024  Doing better overall as far as allergies go  On singulair, xyzal, flonase  Asthma controlled as well  No sinus infection issues    Interval hx 10/2023  Started IT but was not able to continue due to insurance potentially becoming out of network  This has resolved    Recently with allergy exacerbation and sinus infection   Son also sick     Interval hx 5/2023  Doing better  Had allergy testing    History of Present Illness: Elisabeth Odell is a 52 y.o. year old female who presents today for discussion of allergies     She's here with her son today    Hx of allergies and asthma for years    Follows with Dr Valerio but would like to transition care    Has been on various antihistamines, nasal spray, singulair  Uses different inhalers, unsure which    Has been on multiple courses of steroids and antibiotics back to back    Was on immunotherapy, sounds like for many years, did find to be helpful  Last allergy shots 6 months ago       Past Medical History:  Past Medical History:   Diagnosis Date    Asthma     GERD (gastroesophageal reflux disease)     IBS (irritable bowel syndrome)     Leaky heart valve 2019    patient states \"leaky valve\"    Psychiatric disorder     anxiety    Stress incontinence in female        Past Surgical History:   Past Surgical History:   Procedure Laterality Date    HX ORTHOPAEDIC Right 2008

## 2025-06-26 ENCOUNTER — OFFICE VISIT (OUTPATIENT)
Age: 55
End: 2025-06-26

## 2025-06-26 VITALS — HEART RATE: 91 BPM | OXYGEN SATURATION: 96 % | SYSTOLIC BLOOD PRESSURE: 124 MMHG | DIASTOLIC BLOOD PRESSURE: 80 MMHG

## 2025-06-26 DIAGNOSIS — J34.2 NASAL SEPTAL DEVIATION: ICD-10-CM

## 2025-06-26 DIAGNOSIS — J45.20 MILD INTERMITTENT ASTHMA WITHOUT COMPLICATION: ICD-10-CM

## 2025-06-26 DIAGNOSIS — J30.89 ALLERGIC RHINITIS DUE TO OTHER ALLERGIC TRIGGER, UNSPECIFIED SEASONALITY: ICD-10-CM

## 2025-06-26 DIAGNOSIS — J01.91 ACUTE RECURRENT SINUSITIS, UNSPECIFIED LOCATION: Primary | ICD-10-CM

## 2025-06-26 RX ORDER — MIRTAZAPINE 15 MG/1
TABLET, FILM COATED ORAL
COMMUNITY

## 2025-06-26 RX ORDER — LEVOCETIRIZINE DIHYDROCHLORIDE 5 MG/1
5 TABLET, FILM COATED ORAL NIGHTLY
Qty: 90 TABLET | Refills: 2 | Status: SHIPPED | OUTPATIENT
Start: 2025-06-26

## 2025-06-26 RX ORDER — MONTELUKAST SODIUM 10 MG/1
10 TABLET ORAL NIGHTLY
Qty: 90 TABLET | Refills: 2 | Status: SHIPPED | OUTPATIENT
Start: 2025-06-26

## 2025-06-26 RX ORDER — LAMOTRIGINE 100 MG/1
TABLET ORAL
COMMUNITY

## 2025-06-26 RX ORDER — BLOOD SUGAR DIAGNOSTIC
STRIP MISCELLANEOUS
COMMUNITY
Start: 2025-05-22

## 2025-06-26 RX ORDER — AZELASTINE 1 MG/ML
1 SPRAY, METERED NASAL 2 TIMES DAILY
Qty: 60 ML | Refills: 1 | Status: SHIPPED | OUTPATIENT
Start: 2025-06-26

## 2025-06-26 RX ORDER — FLUTICASONE PROPIONATE 50 MCG
1 SPRAY, SUSPENSION (ML) NASAL DAILY
Qty: 16 G | Refills: 3 | Status: SHIPPED | OUTPATIENT
Start: 2025-06-26

## 2025-06-26 NOTE — PROGRESS NOTES
No chief complaint on file.    Allergies worse since last visit  
DAILY (IN THE MORNING AND AT NOON)      Fluticasone furoate-vilanterol (BREO ELLIPTA) 200-25 MCG/INH AEPB inhaler Inhale 1 puff into the lungs 2 times daily      fluticasone-salmeterol (ADVAIR HFA) 230-21 MCG/ACT inhaler ceived the following from [facility]: Outside name: Advair -21 mcg/actuation inhaler      Fluticasone-Umeclidin-Vilant (TRELEGY ELLIPTA) 200-62.5-25 MCG/INH AEPB ceived the following from [facility]: Outside name: Trelegy Ellipta 200-62.5-25 mcg dsdv      hydrOXYzine HCl (ATARAX) 25 MG tablet ceived the following from [facility]: Outside name: hydrOXYzine HCL (ATARAX) 25 mg tablet      pantoprazole (PROTONIX) 40 MG tablet Take 1 tablet by mouth daily      Lancets (ONETOUCH DELICA PLUS [id number]) MISC  (Patient not taking: Reported on 2025)      naproxen (NAPROSYN) 500 MG tablet Take 1 tablet by mouth in the morning and 1 tablet in the evening. (Patient not taking: Reported on 2025)      norgestimate-ethinyl estradiol (ORTHO-CYCLEN) 0.25-35 MG-MCG per tablet Take 1 tablet by mouth daily (Patient not taking: Reported on 2025)      OXcarbazepine (TRILEPTAL) 300 MG tablet Take 1 tablet by mouth (Patient not taking: Reported on 2025)      tiotropium (SPIRIVA RESPIMAT) 1.25 MCG/ACT AERS inhaler ceived the following from [facility]: Outside name: Spiriva Respimat 1.25 mcg/actuation inhaler (Patient not taking: Reported on 2025)       No current facility-administered medications for this visit.           Allergies:   Allergies   Allergen Reactions    Lortab [Hydrocodone-Acetaminophen] Hives     Could have been morphine    Morphine Hives     Could have been hydrocodone       Social History:   Social History     Tobacco Use    Smoking status: Former     Packs/day: 0.25     Years: 10.00     Pack years: 2.50     Types: Cigarettes     Quit date: 1996     Years since quittin.8    Smokeless tobacco: Never

## 2025-07-23 ENCOUNTER — HOSPITAL ENCOUNTER (OUTPATIENT)
Facility: HOSPITAL | Age: 55
Discharge: HOME | End: 2025-07-26
Attending: OTOLARYNGOLOGY
Payer: MEDICARE

## 2025-07-23 DIAGNOSIS — J01.91 ACUTE RECURRENT SINUSITIS, UNSPECIFIED LOCATION: ICD-10-CM

## 2025-07-23 DIAGNOSIS — J34.2 NASAL SEPTAL DEVIATION: ICD-10-CM

## 2025-07-23 PROCEDURE — 70486 CT MAXILLOFACIAL W/O DYE: CPT

## 2025-07-25 ENCOUNTER — TELEPHONE (OUTPATIENT)
Age: 55
End: 2025-07-25

## 2025-07-25 NOTE — TELEPHONE ENCOUNTER
Good afternoon, Pt called regarding her CT scan results. Pt was advised CT scan results has not been reviewed yet. She can be reached at [phone].

## 2025-07-28 ENCOUNTER — TELEPHONE (OUTPATIENT)
Age: 55
End: 2025-07-28

## 2025-07-28 NOTE — TELEPHONE ENCOUNTER
Patient call this afternoon to speak with Dr. Kim about her CT results, please call patient @ [phone] to discuss.

## 2025-08-26 ENCOUNTER — OFFICE VISIT (OUTPATIENT)
Age: 55
End: 2025-08-26
Payer: MEDICARE

## 2025-08-26 VITALS
HEIGHT: 63 IN | RESPIRATION RATE: 16 BRPM | OXYGEN SATURATION: 98 % | HEART RATE: 97 BPM | SYSTOLIC BLOOD PRESSURE: 118 MMHG | DIASTOLIC BLOOD PRESSURE: 78 MMHG | BODY MASS INDEX: 45.35 KG/M2

## 2025-08-26 DIAGNOSIS — J32.3 CHRONIC SPHENOIDAL SINUSITIS: ICD-10-CM

## 2025-08-26 DIAGNOSIS — J01.91 ACUTE RECURRENT SINUSITIS, UNSPECIFIED LOCATION: ICD-10-CM

## 2025-08-26 DIAGNOSIS — Z91.09 ENVIRONMENTAL ALLERGIES: ICD-10-CM

## 2025-08-26 DIAGNOSIS — J34.2 NASAL SEPTAL DEVIATION: Primary | ICD-10-CM

## 2025-08-26 PROCEDURE — 99214 OFFICE O/P EST MOD 30 MIN: CPT | Performed by: OTOLARYNGOLOGY

## 2025-08-26 RX ORDER — CEFDINIR 300 MG/1
300 CAPSULE ORAL 2 TIMES DAILY
Qty: 20 CAPSULE | Refills: 0 | Status: SHIPPED | OUTPATIENT
Start: 2025-08-26 | End: 2025-09-05

## 2025-08-26 RX ORDER — PREDNISONE 10 MG/1
TABLET ORAL
Qty: 24 TABLET | Refills: 0 | Status: SHIPPED | OUTPATIENT
Start: 2025-08-26

## 2025-08-27 PROBLEM — J34.2 NASAL SEPTAL DEVIATION: Status: ACTIVE | Noted: 2025-08-27

## 2025-08-27 PROBLEM — J32.3 CHRONIC SPHENOIDAL SINUSITIS: Status: ACTIVE | Noted: 2025-08-27

## 2025-08-27 PROBLEM — J01.91 ACUTE RECURRENT SINUSITIS: Status: ACTIVE | Noted: 2025-08-27

## 2025-08-27 PROBLEM — Z91.09 ENVIRONMENTAL ALLERGIES: Status: ACTIVE | Noted: 2025-08-27

## 2025-08-28 ENCOUNTER — TELEPHONE (OUTPATIENT)
Age: 55
End: 2025-08-28

## (undated) DEVICE — SUTURE VCRL SZ 0 L18IN ABSRB VLT L40MM CT 1/2 CIR J752D

## (undated) DEVICE — TRAY PREP DRY W/ PREM GLV 2 APPL 6 SPNG 2 UNDPD 1 OVERWRAP

## (undated) DEVICE — STERILE POLYISOPRENE POWDER-FREE SURGICAL GLOVES: Brand: PROTEXIS

## (undated) DEVICE — APPLICATOR SURG XL L38CM FOR ARISTA ABSRB HEMSTAT FLEXITIP

## (undated) DEVICE — NEEDLE HYPO 22GA L1.5IN BLK S STL HUB POLYPR SHLD REG BVL

## (undated) DEVICE — CLICKLINE SCISSORS INSERT: Brand: CLICKLINE

## (undated) DEVICE — VCARE MEDIUM, UTERINE MANIPULATOR, VAGINAL-CERVICAL-AHLUWALIA'S-RETRACTOR-ELEVATOR: Brand: VCARE

## (undated) DEVICE — SOLUTION IV 1000ML 0.9% SOD CHL

## (undated) DEVICE — REM POLYHESIVE ADULT PATIENT RETURN ELECTRODE: Brand: VALLEYLAB

## (undated) DEVICE — Device

## (undated) DEVICE — STERILE POLYISOPRENE POWDER-FREE SURGICAL GLOVES WITH EMOLLIENT COATING: Brand: PROTEXIS

## (undated) DEVICE — TUBING FLTR PLUME AWAY EVAC W/ SUCT DEV DISP PUREVIEW

## (undated) DEVICE — DEVICE TRNSF SPIK STL 2008S] MICROTEK MEDICAL INC]

## (undated) DEVICE — AGENT HEMSTAT 3GM PURIFIED PLNT STARCH PWD ABSRB ARISTA AH

## (undated) DEVICE — CANISTER, RIGID, 3000CC: Brand: MEDLINE INDUSTRIES, INC.

## (undated) DEVICE — DERMABOND SKIN ADH 0.7ML -- DERMABOND ADVANCED 12/BX

## (undated) DEVICE — SPONGE GZ W4XL4IN COT RADPQ HIGHLY ABSRB

## (undated) DEVICE — PREP SKN CHLRAPRP APL 26ML STR --

## (undated) DEVICE — INFECTION CONTROL KIT SYS

## (undated) DEVICE — PREP PAD BNS: Brand: CONVERTORS

## (undated) DEVICE — NDL CTR 10/20 DBL MAGS PK: Brand: CARDINAL HEALTH

## (undated) DEVICE — UNDERPANTS MAT L XL SEAMLESS CLR CODE WAISTBAND KNIT

## (undated) DEVICE — PAD,SANITARY,11 IN,MAXI,N-STRL,IND WRAP: Brand: MEDLINE

## (undated) DEVICE — SURGICAL PROCEDURE PACK GYN LAPAROSCOPY CUST SMH LF

## (undated) DEVICE — SUTURE VCRL SZ 0 L36IN ABSRB UD L40MM CT 1/2 CIR TAPERPOINT J958H

## (undated) DEVICE — SOLUTION INJ 1000ML ST H2O FLX CONT

## (undated) DEVICE — Z INACTIVE USE 2527070 DRAPE SURG W40XL44IN UNDERBUTTOCK SMS POLYPR W/ PCH BK DISP

## (undated) DEVICE — DISSECTOR LAP DIA5MM BLNT TIP ENDOPATH

## (undated) DEVICE — LAPAROSCOPIC WIRE L-HOOK ELECTRODE COATED: Brand: CLEANCOAT

## (undated) DEVICE — SUTURE MCRYL SZ 4-0 L27IN ABSRB UD L19MM PS-2 1/2 CIR PRIM Y426H

## (undated) DEVICE — SEALER ENDOSCP L37CM NANO COAT BLNT TIP LAP DIV

## (undated) DEVICE — TOTAL TRAY, 16FR 10ML SIL FOLEY, URN: Brand: MEDLINE

## (undated) DEVICE — LAPAROSCOPIC TROCAR SLEEVE/SINGLE USE: Brand: KII® OPTICAL ACCESS SYSTEM

## (undated) DEVICE — TROCAR: Brand: KII® SLEEVE

## (undated) DEVICE — STRAP,POSITIONING,KNEE/BODY,FOAM,4X60": Brand: MEDLINE

## (undated) DEVICE — COVER LT HNDL PLAS RIG 1 PER PK

## (undated) DEVICE — SURGICAL PROCEDURE PACK BASIN MAJ SET CUST NO CAUT